# Patient Record
Sex: MALE | Race: WHITE | Employment: FULL TIME | URBAN - METROPOLITAN AREA
[De-identification: names, ages, dates, MRNs, and addresses within clinical notes are randomized per-mention and may not be internally consistent; named-entity substitution may affect disease eponyms.]

---

## 2017-02-18 LAB
CHOLESTEROL, TOTAL: 274 MG/DL
CHOLESTEROL/HDL RATIO: ABNORMAL
HDLC SERPL-MCNC: 47 MG/DL (ref 35–70)
LDL CHOLESTEROL CALCULATED: 186 MG/DL (ref 0–160)
TRIGL SERPL-MCNC: 207 MG/DL
TSH SERPL DL<=0.05 MIU/L-ACNC: 1.35 UIU/ML
VLDLC SERPL CALC-MCNC: 41 MG/DL

## 2017-02-23 DIAGNOSIS — R79.89 LOW TESTOSTERONE: Primary | ICD-10-CM

## 2017-02-23 DIAGNOSIS — E78.00 HYPERCHOLESTEROLEMIA: ICD-10-CM

## 2017-03-14 ENCOUNTER — OFFICE VISIT (OUTPATIENT)
Dept: FAMILY MEDICINE CLINIC | Age: 37
End: 2017-03-14

## 2017-03-14 VITALS
RESPIRATION RATE: 16 BRPM | HEART RATE: 87 BPM | WEIGHT: 280 LBS | BODY MASS INDEX: 39.05 KG/M2 | OXYGEN SATURATION: 98 % | SYSTOLIC BLOOD PRESSURE: 135 MMHG | TEMPERATURE: 98.6 F | DIASTOLIC BLOOD PRESSURE: 85 MMHG

## 2017-03-14 DIAGNOSIS — E78.00 HYPERCHOLESTEROLEMIA: ICD-10-CM

## 2017-03-14 DIAGNOSIS — R07.89 CHEST WALL PAIN: ICD-10-CM

## 2017-03-14 DIAGNOSIS — F11.11 OPIOID ABUSE, IN REMISSION (HCC): ICD-10-CM

## 2017-03-14 DIAGNOSIS — R79.89 LOW TESTOSTERONE: ICD-10-CM

## 2017-03-14 DIAGNOSIS — K21.9 GASTROESOPHAGEAL REFLUX DISEASE, ESOPHAGITIS PRESENCE NOT SPECIFIED: Primary | ICD-10-CM

## 2017-03-14 PROCEDURE — 99214 OFFICE O/P EST MOD 30 MIN: CPT | Performed by: FAMILY MEDICINE

## 2017-03-14 RX ORDER — ATORVASTATIN CALCIUM 20 MG/1
20 TABLET, FILM COATED ORAL DAILY
Qty: 30 TABLET | Refills: 5 | Status: SHIPPED | OUTPATIENT
Start: 2017-03-14 | End: 2017-09-18 | Stop reason: SDUPTHER

## 2017-03-14 RX ORDER — TESTOSTERONE CYPIONATE 200 MG/ML
200 INJECTION INTRAMUSCULAR ONCE
Qty: 10 ML | Refills: 0 | Status: SHIPPED | OUTPATIENT
Start: 2017-03-14 | End: 2017-04-04 | Stop reason: SDUPTHER

## 2017-03-31 ENCOUNTER — TELEPHONE (OUTPATIENT)
Dept: FAMILY MEDICINE CLINIC | Age: 37
End: 2017-03-31

## 2017-03-31 DIAGNOSIS — R79.89 ELEVATED PROLACTIN LEVEL: Primary | ICD-10-CM

## 2017-03-31 DIAGNOSIS — E78.00 HYPERCHOLESTEROLEMIA: ICD-10-CM

## 2017-03-31 DIAGNOSIS — R79.89 LOW TESTOSTERONE: ICD-10-CM

## 2017-04-04 DIAGNOSIS — R79.89 LOW TESTOSTERONE: ICD-10-CM

## 2017-04-04 RX ORDER — TESTOSTERONE CYPIONATE 200 MG/ML
200 INJECTION INTRAMUSCULAR ONCE
Qty: 10 ML | Refills: 0 | Status: SHIPPED | OUTPATIENT
Start: 2017-04-04 | End: 2017-05-23 | Stop reason: SDUPTHER

## 2017-04-11 ENCOUNTER — TELEPHONE (OUTPATIENT)
Dept: FAMILY MEDICINE CLINIC | Age: 37
End: 2017-04-11

## 2017-05-17 ENCOUNTER — OFFICE VISIT (OUTPATIENT)
Dept: FAMILY MEDICINE CLINIC | Age: 37
End: 2017-05-17

## 2017-05-17 VITALS
RESPIRATION RATE: 20 BRPM | OXYGEN SATURATION: 98 % | WEIGHT: 278 LBS | HEART RATE: 67 BPM | BODY MASS INDEX: 38.77 KG/M2 | SYSTOLIC BLOOD PRESSURE: 132 MMHG | DIASTOLIC BLOOD PRESSURE: 80 MMHG

## 2017-05-17 DIAGNOSIS — R10.11 RIGHT UPPER QUADRANT ABDOMINAL PAIN: Primary | ICD-10-CM

## 2017-05-17 LAB
BILIRUBIN, POC: NEGATIVE
BLOOD URINE, POC: NEGATIVE
CLARITY, POC: CLEAR
COLOR, POC: YELLOW
GLUCOSE URINE, POC: NEGATIVE
KETONES, POC: NEGATIVE
LEUKOCYTE EST, POC: NEGATIVE
NITRITE, POC: NEGATIVE
PH, POC: 6
PROTEIN, POC: NORMAL
SPECIFIC GRAVITY, POC: 1.01
UROBILINOGEN, POC: 0.2

## 2017-05-17 PROCEDURE — 81002 URINALYSIS NONAUTO W/O SCOPE: CPT | Performed by: FAMILY MEDICINE

## 2017-05-17 PROCEDURE — 99214 OFFICE O/P EST MOD 30 MIN: CPT | Performed by: FAMILY MEDICINE

## 2017-05-17 RX ORDER — ESOMEPRAZOLE MAGNESIUM 20 MG/1
20 TABLET, DELAYED RELEASE ORAL 2 TIMES DAILY
COMMUNITY
End: 2021-03-12

## 2017-05-23 ENCOUNTER — OFFICE VISIT (OUTPATIENT)
Dept: FAMILY MEDICINE CLINIC | Age: 37
End: 2017-05-23

## 2017-05-23 VITALS
BODY MASS INDEX: 40.56 KG/M2 | DIASTOLIC BLOOD PRESSURE: 83 MMHG | SYSTOLIC BLOOD PRESSURE: 120 MMHG | OXYGEN SATURATION: 97 % | HEART RATE: 76 BPM | TEMPERATURE: 95.9 F | WEIGHT: 290.8 LBS

## 2017-05-23 DIAGNOSIS — E78.00 HYPERCHOLESTEROLEMIA: ICD-10-CM

## 2017-05-23 DIAGNOSIS — K44.9 HIATAL HERNIA: ICD-10-CM

## 2017-05-23 DIAGNOSIS — R10.11 RUQ ABDOMINAL PAIN: Primary | ICD-10-CM

## 2017-05-23 DIAGNOSIS — K21.9 GASTROESOPHAGEAL REFLUX DISEASE, ESOPHAGITIS PRESENCE NOT SPECIFIED: ICD-10-CM

## 2017-05-23 DIAGNOSIS — R79.89 LOW TESTOSTERONE: ICD-10-CM

## 2017-05-23 DIAGNOSIS — F11.11 OPIOID ABUSE, IN REMISSION (HCC): ICD-10-CM

## 2017-05-23 DIAGNOSIS — E66.01 MORBID OBESITY, UNSPECIFIED OBESITY TYPE (HCC): ICD-10-CM

## 2017-05-23 PROCEDURE — 99214 OFFICE O/P EST MOD 30 MIN: CPT | Performed by: FAMILY MEDICINE

## 2017-05-23 RX ORDER — TESTOSTERONE CYPIONATE 200 MG/ML
200 INJECTION INTRAMUSCULAR WEEKLY
Qty: 10 ML | Refills: 5 | Status: SHIPPED | OUTPATIENT
Start: 2017-05-23 | End: 2019-09-23

## 2017-05-23 RX ORDER — CITALOPRAM 20 MG/1
20 TABLET ORAL DAILY
Qty: 30 TABLET | Refills: 5 | Status: SHIPPED | OUTPATIENT
Start: 2017-05-23 | End: 2018-05-08 | Stop reason: SDUPTHER

## 2017-06-09 ENCOUNTER — HOSPITAL ENCOUNTER (OUTPATIENT)
Dept: ULTRASOUND IMAGING | Age: 37
Discharge: HOME | End: 2017-06-09
Attending: FAMILY MEDICINE | Admitting: FAMILY MEDICINE

## 2017-06-09 DIAGNOSIS — R10.11 RUQ ABDOMINAL PAIN: ICD-10-CM

## 2017-06-09 DIAGNOSIS — R10.11 RIGHT UPPER QUADRANT PAIN: ICD-10-CM

## 2017-09-18 DIAGNOSIS — E78.00 HYPERCHOLESTEROLEMIA: ICD-10-CM

## 2017-09-18 RX ORDER — ATORVASTATIN CALCIUM 20 MG/1
20 TABLET, FILM COATED ORAL DAILY
Qty: 30 TABLET | Refills: 5 | Status: SHIPPED | OUTPATIENT
Start: 2017-09-18 | End: 2018-04-16 | Stop reason: SDUPTHER

## 2017-09-18 RX ORDER — ATORVASTATIN CALCIUM 20 MG/1
20 TABLET, FILM COATED ORAL DAILY
Qty: 30 TABLET | Refills: 5 | Status: CANCELLED | OUTPATIENT
Start: 2017-09-18

## 2017-10-03 DIAGNOSIS — R79.89 LOW TESTOSTERONE: ICD-10-CM

## 2017-10-04 ENCOUNTER — TELEPHONE (OUTPATIENT)
Dept: FAMILY MEDICINE CLINIC | Age: 37
End: 2017-10-04

## 2017-10-04 RX ORDER — TESTOSTERONE CYPIONATE 200 MG/ML
INJECTION INTRAMUSCULAR
Qty: 10 ML | Refills: 5 | Status: SHIPPED | OUTPATIENT
Start: 2017-10-04 | End: 2018-04-22 | Stop reason: SDUPTHER

## 2017-10-04 NOTE — TELEPHONE ENCOUNTER
Pharm needs clarification for the testosterone. The script states:  INJECT 1 ML INTRAMUSCULARLY ONCE FOR 1 DOSE            Pharm would like to know if this should be weekly.

## 2018-04-16 DIAGNOSIS — E78.00 HYPERCHOLESTEROLEMIA: ICD-10-CM

## 2018-04-16 RX ORDER — ATORVASTATIN CALCIUM 20 MG/1
TABLET, FILM COATED ORAL
Qty: 30 TABLET | Refills: 4 | Status: SHIPPED | OUTPATIENT
Start: 2018-04-16 | End: 2018-09-24 | Stop reason: SDUPTHER

## 2018-04-22 DIAGNOSIS — R79.89 LOW TESTOSTERONE: ICD-10-CM

## 2018-04-24 RX ORDER — TESTOSTERONE CYPIONATE 200 MG/ML
INJECTION INTRAMUSCULAR
Qty: 10 ML | Refills: 0 | Status: SHIPPED | OUTPATIENT
Start: 2018-04-24 | End: 2018-09-10 | Stop reason: SDUPTHER

## 2018-05-08 ENCOUNTER — OFFICE VISIT (OUTPATIENT)
Dept: FAMILY MEDICINE CLINIC | Age: 38
End: 2018-05-08

## 2018-05-08 VITALS
DIASTOLIC BLOOD PRESSURE: 75 MMHG | HEART RATE: 107 BPM | OXYGEN SATURATION: 97 % | WEIGHT: 278 LBS | SYSTOLIC BLOOD PRESSURE: 137 MMHG | HEIGHT: 72 IN | RESPIRATION RATE: 16 BRPM | TEMPERATURE: 96.5 F | BODY MASS INDEX: 37.65 KG/M2

## 2018-05-08 DIAGNOSIS — F11.11 OPIOID ABUSE, IN REMISSION (HCC): ICD-10-CM

## 2018-05-08 DIAGNOSIS — N46.9 MALE INFERTILITY: ICD-10-CM

## 2018-05-08 DIAGNOSIS — G89.29 CHRONIC LEFT SHOULDER PAIN: ICD-10-CM

## 2018-05-08 DIAGNOSIS — F41.9 ANXIETY: ICD-10-CM

## 2018-05-08 DIAGNOSIS — E66.9 OBESITY (BMI 30-39.9): ICD-10-CM

## 2018-05-08 DIAGNOSIS — R79.89 LOW TESTOSTERONE: ICD-10-CM

## 2018-05-08 DIAGNOSIS — K44.9 HIATAL HERNIA: ICD-10-CM

## 2018-05-08 DIAGNOSIS — Z00.00 ROUTINE GENERAL MEDICAL EXAMINATION AT A HEALTH CARE FACILITY: Primary | ICD-10-CM

## 2018-05-08 DIAGNOSIS — M25.512 CHRONIC LEFT SHOULDER PAIN: ICD-10-CM

## 2018-05-08 DIAGNOSIS — E78.00 HYPERCHOLESTEROLEMIA: ICD-10-CM

## 2018-05-08 DIAGNOSIS — R79.89 ELEVATED PROLACTIN LEVEL: ICD-10-CM

## 2018-05-08 PROCEDURE — 99396 PREV VISIT EST AGE 40-64: CPT | Performed by: FAMILY MEDICINE

## 2018-05-08 RX ORDER — CITALOPRAM 20 MG/1
20 TABLET ORAL DAILY
Qty: 30 TABLET | Refills: 5 | Status: SHIPPED | OUTPATIENT
Start: 2018-05-08 | End: 2018-12-27 | Stop reason: SDUPTHER

## 2018-05-08 ASSESSMENT — PATIENT HEALTH QUESTIONNAIRE - PHQ9
SUM OF ALL RESPONSES TO PHQ9 QUESTIONS 1 & 2: 0
2. FEELING DOWN, DEPRESSED OR HOPELESS: 0
1. LITTLE INTEREST OR PLEASURE IN DOING THINGS: 0
SUM OF ALL RESPONSES TO PHQ QUESTIONS 1-9: 0

## 2018-05-20 DIAGNOSIS — R79.89 LOW TESTOSTERONE: Primary | ICD-10-CM

## 2018-05-20 DIAGNOSIS — R79.89 ELEVATED PROLACTIN LEVEL: ICD-10-CM

## 2018-05-24 ENCOUNTER — TELEPHONE (OUTPATIENT)
Dept: FAMILY MEDICINE CLINIC | Age: 38
End: 2018-05-24

## 2018-05-24 DIAGNOSIS — R79.89 ELEVATED PROLACTIN LEVEL: Primary | ICD-10-CM

## 2018-07-09 DIAGNOSIS — R79.89 ELEVATED PROLACTIN LEVEL: ICD-10-CM

## 2018-07-09 DIAGNOSIS — R79.89 LOW TESTOSTERONE: ICD-10-CM

## 2018-07-10 ENCOUNTER — TELEPHONE (OUTPATIENT)
Dept: FAMILY MEDICINE CLINIC | Age: 38
End: 2018-07-10

## 2018-07-10 NOTE — TELEPHONE ENCOUNTER
Just an update. Richard Roe Called and spoke with pt and he acknowledges results. He states his mom is a RN that works for an endocrinologist and he will go to him.

## 2018-07-10 NOTE — TELEPHONE ENCOUNTER
MRI shows no large mass in pituitary but a small nonspecific finding that could suggest cause of his hormone balance issues.   Pt would benefit from referral to endocrine (dr. Skyler Fiore)  thanks

## 2018-07-10 NOTE — TELEPHONE ENCOUNTER
Pt called again to check on the status of results. He is anxious and would like to hear from the office as soon as possible. Please advise. Thanks. Da Burr [phone] (home)   Ok to leave message.

## 2018-08-28 ENCOUNTER — TELEPHONE (OUTPATIENT)
Dept: FAMILY MEDICINE CLINIC | Age: 38
End: 2018-08-28

## 2018-08-28 DIAGNOSIS — R79.89 LOW TESTOSTERONE: Primary | ICD-10-CM

## 2018-09-10 DIAGNOSIS — R79.89 LOW TESTOSTERONE: ICD-10-CM

## 2018-09-12 RX ORDER — TESTOSTERONE CYPIONATE 200 MG/ML
INJECTION INTRAMUSCULAR
Qty: 10 ML | Refills: 1 | Status: SHIPPED | OUTPATIENT
Start: 2018-09-12 | End: 2019-09-23

## 2018-09-12 NOTE — TELEPHONE ENCOUNTER
Refill request for testosterone injections medication.      Name of Philip Shabazz    Last visit - 5/8/18     Pending visit - No future apt sched at this time     Last refill -4/24/18

## 2018-12-26 ENCOUNTER — TELEPHONE (OUTPATIENT)
Dept: FAMILY MEDICINE CLINIC | Age: 38
End: 2018-12-26

## 2018-12-27 ENCOUNTER — OFFICE VISIT (OUTPATIENT)
Dept: FAMILY MEDICINE CLINIC | Age: 38
End: 2018-12-27
Payer: COMMERCIAL

## 2018-12-27 VITALS
RESPIRATION RATE: 18 BRPM | TEMPERATURE: 96.5 F | SYSTOLIC BLOOD PRESSURE: 132 MMHG | HEART RATE: 83 BPM | OXYGEN SATURATION: 95 % | DIASTOLIC BLOOD PRESSURE: 81 MMHG

## 2018-12-27 DIAGNOSIS — L72.3 SEBACEOUS CYST: ICD-10-CM

## 2018-12-27 DIAGNOSIS — R79.89 LOW TESTOSTERONE: ICD-10-CM

## 2018-12-27 DIAGNOSIS — R19.7 DIARRHEA, UNSPECIFIED TYPE: ICD-10-CM

## 2018-12-27 DIAGNOSIS — R79.89 PROLACTIN INCREASED: ICD-10-CM

## 2018-12-27 DIAGNOSIS — R73.9 HYPERGLYCEMIA: Primary | ICD-10-CM

## 2018-12-27 PROCEDURE — 99214 OFFICE O/P EST MOD 30 MIN: CPT | Performed by: FAMILY MEDICINE

## 2018-12-27 RX ORDER — CIPROFLOXACIN 500 MG/1
500 TABLET, FILM COATED ORAL 2 TIMES DAILY
Qty: 14 TABLET | Refills: 0 | Status: SHIPPED | OUTPATIENT
Start: 2018-12-27 | End: 2019-01-03

## 2018-12-27 RX ORDER — CITALOPRAM 20 MG/1
TABLET ORAL
Qty: 30 TABLET | Refills: 5 | Status: SHIPPED | OUTPATIENT
Start: 2018-12-27 | End: 2019-06-22 | Stop reason: SDUPTHER

## 2018-12-28 ENCOUNTER — HOSPITAL ENCOUNTER (OUTPATIENT)
Age: 38
Setting detail: SPECIMEN
Discharge: HOME | End: 2018-12-28
Payer: COMMERCIAL

## 2018-12-28 ENCOUNTER — TELEPHONE (OUTPATIENT)
Dept: FAMILY MEDICINE CLINIC | Age: 38
End: 2018-12-28

## 2018-12-28 DIAGNOSIS — R19.7 DIARRHEA, UNSPECIFIED TYPE: ICD-10-CM

## 2018-12-28 LAB — WHITE BLOOD CELLS (WBC), STOOL: ABNORMAL

## 2018-12-28 PROCEDURE — 87328 CRYPTOSPORIDIUM AG IA: CPT

## 2018-12-28 PROCEDURE — 87505 NFCT AGENT DETECTION GI: CPT

## 2018-12-28 PROCEDURE — 87336 ENTAMOEB HIST DISPR AG IA: CPT

## 2018-12-28 PROCEDURE — 87046 STOOL CULTR AEROBIC BACT EA: CPT

## 2018-12-28 PROCEDURE — 83630 LACTOFERRIN FECAL (QUAL): CPT

## 2018-12-29 LAB
CRYPTOSPORIDIUM ANTIGEN STOOL: NORMAL
E HISTOLYTICA ANTIGEN STOOL: NORMAL
GI BACTERIAL PATHOGENS BY PCR: NORMAL
GIARDIA ANTIGEN STOOL: NORMAL

## 2019-03-23 DIAGNOSIS — E78.00 HYPERCHOLESTEROLEMIA: ICD-10-CM

## 2019-03-25 RX ORDER — ATORVASTATIN CALCIUM 20 MG/1
TABLET, FILM COATED ORAL
Qty: 90 TABLET | Refills: 1 | Status: SHIPPED | OUTPATIENT
Start: 2019-03-25 | End: 2019-09-23 | Stop reason: SDUPTHER

## 2019-06-24 RX ORDER — CITALOPRAM 20 MG/1
TABLET ORAL
Qty: 90 TABLET | Refills: 1 | Status: SHIPPED | OUTPATIENT
Start: 2019-06-24 | End: 2019-12-30

## 2019-09-23 ENCOUNTER — OFFICE VISIT (OUTPATIENT)
Dept: FAMILY MEDICINE CLINIC | Age: 39
End: 2019-09-23
Payer: COMMERCIAL

## 2019-09-23 VITALS
SYSTOLIC BLOOD PRESSURE: 131 MMHG | DIASTOLIC BLOOD PRESSURE: 84 MMHG | RESPIRATION RATE: 12 BRPM | TEMPERATURE: 98.3 F | BODY MASS INDEX: 39.75 KG/M2 | OXYGEN SATURATION: 96 % | WEIGHT: 289 LBS | HEART RATE: 74 BPM

## 2019-09-23 DIAGNOSIS — E78.00 HYPERCHOLESTEROLEMIA: ICD-10-CM

## 2019-09-23 DIAGNOSIS — E66.9 OBESITY (BMI 30-39.9): ICD-10-CM

## 2019-09-23 DIAGNOSIS — E11.9 TYPE 2 DIABETES MELLITUS WITHOUT COMPLICATION, WITHOUT LONG-TERM CURRENT USE OF INSULIN (HCC): ICD-10-CM

## 2019-09-23 DIAGNOSIS — R73.9 HYPERGLYCEMIA: ICD-10-CM

## 2019-09-23 DIAGNOSIS — E23.6 PITUITARY MASS (HCC): ICD-10-CM

## 2019-09-23 DIAGNOSIS — J06.9 ACUTE URI: Primary | ICD-10-CM

## 2019-09-23 DIAGNOSIS — R51.9 SINUS HEADACHE: ICD-10-CM

## 2019-09-23 DIAGNOSIS — R79.89 LOW TESTOSTERONE: ICD-10-CM

## 2019-09-23 PROCEDURE — 99214 OFFICE O/P EST MOD 30 MIN: CPT | Performed by: FAMILY MEDICINE

## 2019-09-23 RX ORDER — AMOXICILLIN AND CLAVULANATE POTASSIUM 875; 125 MG/1; MG/1
1 TABLET, FILM COATED ORAL 2 TIMES DAILY
Qty: 20 TABLET | Refills: 0 | Status: SHIPPED | OUTPATIENT
Start: 2019-09-23 | End: 2019-10-03

## 2019-09-23 RX ORDER — ATORVASTATIN CALCIUM 20 MG/1
TABLET, FILM COATED ORAL
Qty: 90 TABLET | Refills: 1 | Status: SHIPPED | OUTPATIENT
Start: 2019-09-23 | End: 2020-04-14 | Stop reason: SDUPTHER

## 2019-09-23 ASSESSMENT — PATIENT HEALTH QUESTIONNAIRE - PHQ9
1. LITTLE INTEREST OR PLEASURE IN DOING THINGS: 0
SUM OF ALL RESPONSES TO PHQ QUESTIONS 1-9: 0
SUM OF ALL RESPONSES TO PHQ9 QUESTIONS 1 & 2: 0
2. FEELING DOWN, DEPRESSED OR HOPELESS: 0
SUM OF ALL RESPONSES TO PHQ QUESTIONS 1-9: 0

## 2019-09-23 NOTE — PROGRESS NOTES
auscultation bilaterally good air entry bilaterally  No crackles, wheeze. Breathing comfortably. Neuro : cranial nerves II-XII intact. Gait within normal limits. Sensation intact, strength 5/5 bilateral upper extremities, bilateral lower extremities     Current Outpatient Medications   Medication Sig Dispense Refill    atorvastatin (LIPITOR) 20 MG tablet TAKE 1 TABLET BY MOUTH ONE TIME A DAY 90 tablet 1    metFORMIN (GLUCOPHAGE) 500 MG tablet Take 500 mg by mouth 2 times daily (with meals)      amoxicillin-clavulanate (AUGMENTIN) 875-125 MG per tablet Take 1 tablet by mouth 2 times daily for 10 days 20 tablet 0    citalopram (CELEXA) 20 MG tablet TAKE 1 TABLET BY MOUTH ONCE DAILY 90 tablet 1    Esomeprazole Magnesium (NEXIUM 24HR) 20 MG TBEC Take 20 mg by mouth 2 times daily       No current facility-administered medications for this visit. ASSESSMENT / PLAN:    1. Acute URI  Persistent sx, c/w acute sinusitis  tx with augmentin 875mg BID x 10d  Cont over the counter/symptomatic treatment. Follow up for persistent symptoms in 7 to 10 days or sooner for worsening symptomatology     2. Hypercholesterolemia  Stable with lipitor  Check bloodwork as below  Management pending results. - atorvastatin (LIPITOR) 20 MG tablet; TAKE 1 TABLET BY MOUTH ONE TIME A DAY  Dispense: 90 tablet; Refill: 1  - Lipid Panel; Future    3. Low testosterone  Off therpay d/t fertility issues  Did have findings of enlarged pituitary with ? Mass  Due f/u MRI, Management pending results. F/u with endocrine    4. Sinus headache  D/t above, tx with augmentin    5. Type 2 diabetes mellitus without complication, without long-term current use of insulin ([facility] [address]  New onset, reviewed prior bloodwork, etiology and tx  Cont metformin 500mg BID, cont lipitor  Check bloodwork and urine as below  Management pending results. - Hemoglobin A1C; Future  - Comprehensive Metabolic Panel; Future  - TSH without Reflex;  Future  - Microalbumin /

## 2019-10-15 DIAGNOSIS — E23.6 PITUITARY MASS (HCC): ICD-10-CM

## 2019-10-17 ENCOUNTER — TELEPHONE (OUTPATIENT)
Dept: FAMILY MEDICINE CLINIC | Age: 39
End: 2019-10-17

## 2019-12-30 RX ORDER — CITALOPRAM 20 MG/1
TABLET ORAL
Qty: 90 TABLET | Refills: 0 | Status: SHIPPED | OUTPATIENT
Start: 2019-12-30 | End: 2020-03-30

## 2020-03-30 RX ORDER — CITALOPRAM 20 MG/1
TABLET ORAL
Qty: 30 TABLET | Refills: 5 | Status: SHIPPED | OUTPATIENT
Start: 2020-03-30 | End: 2020-04-08 | Stop reason: SDUPTHER

## 2020-04-08 RX ORDER — CITALOPRAM 20 MG/1
TABLET ORAL
Qty: 30 TABLET | Refills: 5 | Status: SHIPPED | OUTPATIENT
Start: 2020-04-08 | End: 2020-04-14 | Stop reason: SDUPTHER

## 2020-04-08 NOTE — TELEPHONE ENCOUNTER
Pt called and said that the pharmacy never got his medication refill from his doctor. Informed him it says he needs and appointment. Pt wants to know if he can get it filled without an appointment with everything that's going on.    Please advise

## 2020-04-09 RX ORDER — ATORVASTATIN CALCIUM 20 MG/1
TABLET, FILM COATED ORAL
Qty: 90 TABLET | Refills: 3 | Status: CANCELLED | OUTPATIENT
Start: 2020-04-09

## 2020-04-14 ENCOUNTER — VIRTUAL VISIT (OUTPATIENT)
Dept: FAMILY MEDICINE CLINIC | Age: 40
End: 2020-04-14
Payer: COMMERCIAL

## 2020-04-14 VITALS — TEMPERATURE: 96.4 F | HEIGHT: 74 IN | RESPIRATION RATE: 12 BRPM | WEIGHT: 270 LBS | BODY MASS INDEX: 34.65 KG/M2

## 2020-04-14 PROCEDURE — 99214 OFFICE O/P EST MOD 30 MIN: CPT | Performed by: FAMILY MEDICINE

## 2020-04-14 RX ORDER — ATORVASTATIN CALCIUM 20 MG/1
TABLET, FILM COATED ORAL
Qty: 90 TABLET | Refills: 1 | Status: CANCELLED | OUTPATIENT
Start: 2020-04-14

## 2020-04-14 RX ORDER — CITALOPRAM 20 MG/1
TABLET ORAL
Qty: 90 TABLET | Refills: 1 | Status: SHIPPED | OUTPATIENT
Start: 2020-04-14 | End: 2020-11-02

## 2020-04-14 RX ORDER — ATORVASTATIN CALCIUM 20 MG/1
TABLET, FILM COATED ORAL
Qty: 90 TABLET | Refills: 1 | Status: SHIPPED | OUTPATIENT
Start: 2020-04-14 | End: 2020-11-02

## 2020-04-14 RX ORDER — ATORVASTATIN CALCIUM 20 MG/1
TABLET, FILM COATED ORAL
Qty: 90 TABLET | Refills: 3 | Status: CANCELLED | OUTPATIENT
Start: 2020-04-14

## 2020-04-14 ASSESSMENT — PATIENT HEALTH QUESTIONNAIRE - PHQ9
SUM OF ALL RESPONSES TO PHQ9 QUESTIONS 1 & 2: 0
2. FEELING DOWN, DEPRESSED OR HOPELESS: 0
SUM OF ALL RESPONSES TO PHQ QUESTIONS 1-9: 0
SUM OF ALL RESPONSES TO PHQ QUESTIONS 1-9: 0
1. LITTLE INTEREST OR PLEASURE IN DOING THINGS: 0

## 2020-07-10 ENCOUNTER — TELEPHONE (OUTPATIENT)
Dept: FAMILY MEDICINE CLINIC | Age: 40
End: 2020-07-10

## 2020-07-10 NOTE — TELEPHONE ENCOUNTER
Patient would like this lab orders faxed to [phone]. Patient would like to have other blood work completed and wanted to speak with clinical. Please advise. Thanks.

## 2020-07-27 NOTE — TELEPHONE ENCOUNTER
Requested Prescriptions     Pending Prescriptions Disp Refills    metFORMIN (GLUCOPHAGE) 500 MG tablet [Pharmacy Med Name: METFORMIN  MG TABLET] 180 tablet 1     Sig: TAKE 1 TABLET BY MOUTH TWICE A DAY     Last ov 4/14/20  Last labs 7/15/20

## 2020-08-04 ENCOUNTER — OFFICE VISIT (OUTPATIENT)
Dept: FAMILY MEDICINE CLINIC | Age: 40
End: 2020-08-04
Payer: COMMERCIAL

## 2020-08-04 VITALS
RESPIRATION RATE: 14 BRPM | WEIGHT: 286.4 LBS | TEMPERATURE: 98 F | HEART RATE: 92 BPM | DIASTOLIC BLOOD PRESSURE: 77 MMHG | BODY MASS INDEX: 36.77 KG/M2 | SYSTOLIC BLOOD PRESSURE: 126 MMHG | OXYGEN SATURATION: 98 %

## 2020-08-04 PROCEDURE — 99214 OFFICE O/P EST MOD 30 MIN: CPT | Performed by: FAMILY MEDICINE

## 2020-08-04 RX ORDER — TESTOSTERONE CYPIONATE 200 MG/ML
INJECTION INTRAMUSCULAR
Qty: 10 ML | Refills: 5 | Status: SHIPPED | OUTPATIENT
Start: 2020-08-04 | End: 2021-02-05

## 2020-08-04 RX ORDER — OMEPRAZOLE 20 MG/1
20 CAPSULE, DELAYED RELEASE ORAL 2 TIMES DAILY
COMMUNITY

## 2020-08-04 NOTE — PROGRESS NOTES
Here for f/u and recheck of blood sugars, testosterone. Pt did see endo a few months ago and a1c was higher at 6.5. They did not change anything/add new meds. Pt is working on eating better, exercise more and feels that it is doing well with improvement in lifestyle. Pt did see endocrine as his testosterone was low again. Pt was off due to sperm production decrease and off meds, it was improved. Pt did freeze sperm as he wanted to get back on testosterone. testosterone level was very low and does feel fatigue, low energy. Pt was on injections in the past and did well with that. Pt would like to resume testosterone therapy. Pt states work is doing ok, did have a little bit decrease in business with COVID but that is getting better. Pt has some irritation to outer ear. Pt feels that there is a foreign body sensation to the cartilage. Sx for about 3 wks. Pt looked at it and RN looked at in, but has continued. No pain but just present and sx are on and off        Except as noted above in the history of present illness, the review of systems is  negative for headache, vision changes, chest pain, shortness of breath, abdominal pain, urinary sx, bowel changes. Past medical, surgical, and social history reviewed and updated  Medications and allergies reviewed and updated       O: /77   Pulse 92   Temp 98 °F (36.7 °C) (Temporal)   Resp 14   Wt 286 lb 6.4 oz (129.9 kg)   SpO2 98%   BMI 36.77 kg/m²   GEN: No acute distress, cooperative, well nourished, alert. HEENT: PEERLA, EOMI , normocephalic/atraumatic, nares and oropharynx clear. Mucous membranes normal, Tympanic membranes clear bilaterally. + tender to palpation to L temporomandibular joint   Neck: soft, supple, no thyromegaly, mass, no Lymphadenopathy  CV: Regular rate and rhythm, no murmur, rubs, gallops. No edema. Resp: Clear to auscultation bilaterally good air entry bilaterally  No crackles, wheeze.  Breathing comfortably. Psych: mood stable, No suicidal thoughts or ideation         Orders Only on 07/15/2020   Component Date Value Ref Range Status    Prolactin 07/15/2020 15.1  4.0 - 15.2 ng/mL Final     No components found for: CHLPL  Lab Results   Component Value Date    TRIG 181 (H) 07/15/2020     Lab Results   Component Value Date    HDL 48 07/15/2020     Lab Results   Component Value Date    LDLCALC 106 (H) 07/15/2020     Lab Results   Component Value Date    LABVLDL 36 07/15/2020       Lab Results   Component Value Date    ALT 25 07/15/2020    AST 23 07/15/2020    ALKPHOS 152 (H) 07/15/2020    BILITOT 0.5 07/15/2020          Lab Results   Component Value Date    LABA1C 6.1 (H) 07/15/2020     No results found for: EAG      ASSESSMENT / PLAN:    1. Low testosterone  Moderate sx s/p d/c of meds  Was off to help with fertility  Wants to resume therapy, refills given as below  f'u bloodwork 6wks after restart of meds  - testosterone cypionate (DEPOTESTOTERONE CYPIONATE) 200 MG/ML injection; INJECT  1ML  INTRAMUSCULARLY EVERY TWO WEEKS  Dispense: 10 mL; Refill: 5  - Testosterone; Future  - CBC; Future  - Basic Metabolic Panel; Future    2. Hyperglycemia, unspecified  a1c stable as below    3. Hypercholesterolemia  Cont statin therapy and monitor    4. Type 2 diabetes mellitus without complication, without long-term current use of insulin (HCC)  Doing well, controlled with a1c at 6.1  Cont metformin, statin therapy  Reviewed bloodwork as above    5. Obesity (BMI 30-39. 9)  Cont lifestyle mgt, diet/exercise    6. TMJ inflammation  L sided, The patient is reassured that these symptoms do not appear to represent a serious or threatening condition. Follow-up appointment:   Pending f/u bloodwork / prn    Discussed use, benefit, and side effects of all prescribed medications. Barriers to medication compliance addressed. All patient questions answered. Pt voiced understanding.   When applicable, patient's outside records were reviewed through [facility]. The patient has signed appropriate paperworks/consents.

## 2020-08-05 ENCOUNTER — TELEPHONE (OUTPATIENT)
Dept: ORTHOPEDIC SURGERY | Age: 40
End: 2020-08-05

## 2020-08-11 ENCOUNTER — TELEPHONE (OUTPATIENT)
Dept: FAMILY MEDICINE CLINIC | Age: 40
End: 2020-08-11

## 2020-08-11 NOTE — TELEPHONE ENCOUNTER
Patient states he spoke with Dr. HADDAD concerning a ear ache; ear is worse and painful down inside the ear. Patient would like a call back. Please advise. Thanks.

## 2020-08-17 ENCOUNTER — TELEPHONE (OUTPATIENT)
Dept: ENT CLINIC | Age: 40
End: 2020-08-17

## 2020-08-17 ENCOUNTER — OFFICE VISIT (OUTPATIENT)
Dept: ENT CLINIC | Age: 40
End: 2020-08-17
Payer: COMMERCIAL

## 2020-08-17 VITALS
BODY MASS INDEX: 37.22 KG/M2 | WEIGHT: 290 LBS | TEMPERATURE: 97.4 F | HEART RATE: 86 BPM | DIASTOLIC BLOOD PRESSURE: 89 MMHG | HEIGHT: 74 IN | SYSTOLIC BLOOD PRESSURE: 136 MMHG

## 2020-08-17 PROCEDURE — 99203 OFFICE O/P NEW LOW 30 MIN: CPT | Performed by: OTOLARYNGOLOGY

## 2020-08-17 PROCEDURE — 69200 CLEAR OUTER EAR CANAL: CPT | Performed by: OTOLARYNGOLOGY

## 2020-08-17 NOTE — PROGRESS NOTES
HISTORY: Family history reviewed. Except as noted in history of present illness, there is no pertinent family history      REVIEW OF SYSTEMS:  All pertinent positive and negative review of systems included in HPI. Otherwise, all systems are reviewed and negative. PHYSICAL EXAMINATION:   GENERAL: wdwn- no acute distress  RESPIRATORY:  No stridor or respiratory distress  COMMUNICATION :  Normal voice  MENTAL STATUS:  Mood and affect normal, oriented X 3  HEAD AND FACE:  No abnormalities of the skin of face or head  EXTERNAL EARS AND NOSE:  Normal pinnae bilateral  FACIAL MUSCLES:  All branches of facial nerve intact  EXTRAOCULAR MUSCLES: Intact with full range of motion  FACE PALPATION:  No tenderness over sinuses. Zygomatic arches and orbital rims intact  OTOSCOPY:  Microscope exam. Normal external auditory canals, tympanic membranes, and middle ear spaces. There is a hair in the external auditory canal on the left brushing against the tympanic membrane. TUNING FORKS: Rinne ++ Schmitz midline at 512 Hz  INTRANASAL:  Septum midline, turbinates normal, meati clear. LIPS, TEETH, GINGIVA:  Normal mucosa  PHARYNX:  Normal  NECK:  No masses. LYMPHATIC:  No cervical adenopathy  SALIVARY GLANDS:  No swelling or masses in the parotid or submandibular salivary glands  THYROID:  No goiter or thyroid masses. IMPRESSION: Foreign body left external auditory canal.    PLAN: Foreign body removed from the left external auditory canal using the microscope and a small alligator forceps. The tickling sensation in the left ear is resolved. FOLLOW-UP: As needed.

## 2020-08-26 ENCOUNTER — TELEPHONE (OUTPATIENT)
Dept: ENT CLINIC | Age: 40
End: 2020-08-26

## 2020-08-26 NOTE — TELEPHONE ENCOUNTER
Patient called to say that the tickling sensation has not gone away in his ear actually he feels that is has gone deeper into his ear because now there is a  crackling noise and when he opens his mouth / jaw it will release it and now the ear is becoming painful, he set up an appointment to come into the office on Sept 2 @ 11:40, but still would like to see if there is anything that can be done in the mean time.  Please advise

## 2020-09-02 ENCOUNTER — OFFICE VISIT (OUTPATIENT)
Dept: ENT CLINIC | Age: 40
End: 2020-09-02
Payer: COMMERCIAL

## 2020-09-02 VITALS
BODY MASS INDEX: 36.65 KG/M2 | WEIGHT: 285.6 LBS | DIASTOLIC BLOOD PRESSURE: 72 MMHG | TEMPERATURE: 97.9 F | SYSTOLIC BLOOD PRESSURE: 131 MMHG | HEIGHT: 74 IN | HEART RATE: 82 BPM

## 2020-09-02 PROBLEM — H92.02 REFERRED OTALGIA OF LEFT EAR: Status: ACTIVE | Noted: 2020-09-02

## 2020-09-02 PROCEDURE — 99213 OFFICE O/P EST LOW 20 MIN: CPT | Performed by: OTOLARYNGOLOGY

## 2020-09-02 RX ORDER — NAPROXEN 500 MG/1
500 TABLET ORAL 2 TIMES DAILY WITH MEALS
Qty: 60 TABLET | Refills: 2 | Status: SHIPPED | OUTPATIENT
Start: 2020-09-02 | End: 2021-03-12

## 2020-09-02 NOTE — PROGRESS NOTES
FOLLOW UP VISIT:    CHIEF COMPLAINT: Left ear pain. INTERIM HISTORY: Seen 2 weeks ago with a tickling, foreign body sensation in the left ear. I removed a foreign body in his symptoms seem to resolve but over the last 7 to 10 days he has had increased symptoms in the left ear. He now has pain deep in the ear. His ear clicks when he opens and closes his mouth. He has no otalgia or otorrhea. His hearing is not affected.       PAST MEDICAL HISTORY:   Social History     Tobacco Use   Smoking Status Never Smoker   Smokeless Tobacco Former User                                                    Social History     Substance and Sexual Activity   Alcohol Use No                                                    Current Outpatient Medications:     omeprazole (PRILOSEC) 20 MG delayed release capsule, Take 20 mg by mouth 2 times daily, Disp: , Rfl:     SYRINGE-NEEDLE, DISP, 3 ML 22G X 1-1/2\" 3 ML MISC, For weekly testosterone injections, Disp: 50 each, Rfl: 5    testosterone cypionate (DEPOTESTOTERONE CYPIONATE) 200 MG/ML injection, INJECT  1ML  INTRAMUSCULARLY EVERY TWO WEEKS, Disp: 10 mL, Rfl: 5    diclofenac sodium (VOLTAREN) 1 % GEL, Apply 2 g topically 4 times daily, Disp: 1 Tube, Rfl: 0    metFORMIN (GLUCOPHAGE) 500 MG tablet, TAKE 1 TABLET BY MOUTH TWICE A DAY, Disp: 180 tablet, Rfl: 1    citalopram (CELEXA) 20 MG tablet, Take 1 tablet by mouth once daily, Disp: 90 tablet, Rfl: 1    atorvastatin (LIPITOR) 20 MG tablet, TAKE 1 TABLET BY MOUTH ONE TIME A DAY, Disp: 90 tablet, Rfl: 1    Esomeprazole Magnesium (NEXIUM 24HR) 20 MG TBEC, Take 20 mg by mouth 2 times daily, Disp: , Rfl:                                                  Past Medical History:   Diagnosis Date    Anxiety     Drug addiction ([facility] [address]     Fatty liver     GERD (gastroesophageal reflux disease)     Hiatal hernia 5/23/2017    Hypercholesterolemia 2/23/2017    Hyperglycemia 12/27/2018    Low testosterone 3/14/2017    Obesity (BMI 30-39.9) 9/16/2016                                                  No past surgical history on file. FAMILY HISTORY: Family history reviewed and except as pertinent to the interim history is not contributory. REVIEW OF SYSTEMS:  All pertinent positive and negative findings included in HPI. Otherwise, all other systems are reviewed and are negative    PHYSICAL EXAMINATION:   GENERAL: wdwn- no acute distress  RESPIRATORY: No stridor. COMMUNICATION :  Normal voice  MENTAL STATUS: Alert and oriented x3  HEAD AND FACE: No skin lesions of the face. EXTERNAL EARS AND NOSE: The external ears and nasal pyramid are normal.  FACIAL MUSCLES: All branches of the facial nerve intact. FACE PALPATION: Zygomatic arches and orbital rims are intact. No tenderness over the sinuses. Tenderness over the left temporomandibular joint. EXTRAOCULAR MUSCLES: Intact with full range of motion. OTOSCOPY:  Normal tympanic membranes, middle ear spaces, and external auditory canals. TUNING FORKS:  Rinne ++ Schmitz midline at 512 Hz  INTRANASAL:  Septum midline, turbinates normal, meati clear. LIPS, TEETH, GINGIVA:  Normal mucosa  PHARYNX:  Normal  NECK:  No masses. LYMPH NODES: No cervical lymphadenopathy. SALIVARY GLANDS: Parotid and submandibular glands normal.  THYROID: No goiter or thyroid nodules palpable. IMPRESSION: Referred pain left ear due to TMJ arthritis. PLAN: Naproxen 500 mg prescribed to be taken twice daily with meals. Dental evaluation may be helpful. FOLLOW-UP: As needed.

## 2020-10-23 ENCOUNTER — OFFICE VISIT (OUTPATIENT)
Dept: PRIMARY CARE CLINIC | Age: 40
End: 2020-10-23
Payer: COMMERCIAL

## 2020-10-23 PROCEDURE — 99211 OFF/OP EST MAY X REQ PHY/QHP: CPT | Performed by: NURSE PRACTITIONER

## 2020-10-23 NOTE — PATIENT INSTRUCTIONS

## 2020-10-23 NOTE — PROGRESS NOTES
Ana Lilia Unger received a viral test for COVID-19. They were educated on isolation and quarantine as appropriate. For any symptoms, they were directed to seek care from their PCP, given contact information to establish with a doctor, directed to an urgent care or the emergency room.

## 2020-10-24 LAB — SARS-COV-2, NAA: NOT DETECTED

## 2021-01-12 ENCOUNTER — TELEPHONE (OUTPATIENT)
Dept: FAMILY MEDICINE CLINIC | Age: 41
End: 2021-01-12

## 2021-01-12 DIAGNOSIS — R79.89 LOW TESTOSTERONE: ICD-10-CM

## 2021-01-12 DIAGNOSIS — E78.00 HYPERCHOLESTEROLEMIA: Primary | ICD-10-CM

## 2021-01-12 DIAGNOSIS — R73.9 HYPERGLYCEMIA, UNSPECIFIED: ICD-10-CM

## 2021-01-12 DIAGNOSIS — E11.9 TYPE 2 DIABETES MELLITUS WITHOUT COMPLICATION, WITHOUT LONG-TERM CURRENT USE OF INSULIN (HCC): ICD-10-CM

## 2021-01-12 NOTE — TELEPHONE ENCOUNTER
Natasha More called requesting orders for lab work. There are orders in his chart from 8/4/2020 for BMP, CBC, and Testosterone. He would also like to check his Prolactin Level and his Hemoglobin A1C. He plans on going to the [facility] on [address]. Please advise. Thanks.         Natasha More [phone] (home)

## 2021-02-05 DIAGNOSIS — R79.89 LOW TESTOSTERONE: ICD-10-CM

## 2021-02-06 RX ORDER — TESTOSTERONE CYPIONATE 200 MG/ML
INJECTION INTRAMUSCULAR
Qty: 1 VIAL | Refills: 4 | Status: SHIPPED | OUTPATIENT
Start: 2021-02-06 | End: 2021-03-12 | Stop reason: SDUPTHER

## 2021-03-02 ENCOUNTER — PATIENT MESSAGE (OUTPATIENT)
Dept: FAMILY MEDICINE CLINIC | Age: 41
End: 2021-03-02

## 2021-03-03 NOTE — TELEPHONE ENCOUNTER
Last office visit was 8/4/20.       Ok to schedule in office or virtual?    Please advise      See note below

## 2021-03-12 ENCOUNTER — OFFICE VISIT (OUTPATIENT)
Dept: FAMILY MEDICINE CLINIC | Age: 41
End: 2021-03-12
Payer: COMMERCIAL

## 2021-03-12 VITALS
DIASTOLIC BLOOD PRESSURE: 64 MMHG | TEMPERATURE: 98.1 F | WEIGHT: 270 LBS | HEART RATE: 91 BPM | BODY MASS INDEX: 34.67 KG/M2 | OXYGEN SATURATION: 99 % | RESPIRATION RATE: 12 BRPM | SYSTOLIC BLOOD PRESSURE: 118 MMHG

## 2021-03-12 DIAGNOSIS — R79.89 LOW TESTOSTERONE: ICD-10-CM

## 2021-03-12 DIAGNOSIS — E78.00 HYPERCHOLESTEROLEMIA: ICD-10-CM

## 2021-03-12 DIAGNOSIS — J30.89 SEASONAL ALLERGIC RHINITIS DUE TO FUNGAL SPORES: ICD-10-CM

## 2021-03-12 DIAGNOSIS — D35.2 PITUITARY MICROADENOMA (HCC): ICD-10-CM

## 2021-03-12 DIAGNOSIS — F11.11 OPIOID ABUSE, IN REMISSION (HCC): ICD-10-CM

## 2021-03-12 DIAGNOSIS — E11.9 TYPE 2 DIABETES MELLITUS WITHOUT COMPLICATION, WITHOUT LONG-TERM CURRENT USE OF INSULIN (HCC): Primary | ICD-10-CM

## 2021-03-12 PROBLEM — R73.9 HYPERGLYCEMIA: Status: RESOLVED | Noted: 2018-12-27 | Resolved: 2021-03-12

## 2021-03-12 PROBLEM — J30.2 SEASONAL ALLERGIC RHINITIS DUE TO FUNGAL SPORES: Status: ACTIVE | Noted: 2021-03-12

## 2021-03-12 PROCEDURE — 99214 OFFICE O/P EST MOD 30 MIN: CPT | Performed by: FAMILY MEDICINE

## 2021-03-12 RX ORDER — CITALOPRAM 20 MG/1
TABLET ORAL
Qty: 30 TABLET | Refills: 5 | Status: SHIPPED | OUTPATIENT
Start: 2021-03-12 | End: 2021-11-01

## 2021-03-12 RX ORDER — ATORVASTATIN CALCIUM 20 MG/1
TABLET, FILM COATED ORAL
Qty: 30 TABLET | Refills: 5 | Status: SHIPPED | OUTPATIENT
Start: 2021-03-12 | End: 2021-11-01

## 2021-03-12 RX ORDER — FLUTICASONE PROPIONATE 50 MCG
1 SPRAY, SUSPENSION (ML) NASAL DAILY
COMMUNITY

## 2021-03-12 RX ORDER — TESTOSTERONE CYPIONATE 200 MG/ML
INJECTION INTRAMUSCULAR
Qty: 10 ML | Refills: 5 | Status: SHIPPED | OUTPATIENT
Start: 2021-03-12 | End: 2021-10-12

## 2021-03-12 ASSESSMENT — PATIENT HEALTH QUESTIONNAIRE - PHQ9
1. LITTLE INTEREST OR PLEASURE IN DOING THINGS: 0
SUM OF ALL RESPONSES TO PHQ QUESTIONS 1-9: 0
SUM OF ALL RESPONSES TO PHQ QUESTIONS 1-9: 0

## 2021-03-12 NOTE — PROGRESS NOTES
Here for fu and recheck of blood sugars, low testosterone    Pt states that stress is doing ok, managing ok despite COVID pandemic. Pt did have some increase in stress related to fertility issues but things seem to be manageable. Pt does continue to stay on suboxone, currently on a very tiny/low dose. Pt is able to take a very tiny dose of medication, but has not been able to get off completely. Pt states that pt and wife are 10 weeks pregnant, and things are doing well. They did IVF and currently working with dr. Dereje Leon, and then will be transferred to regular OB/gyn. Pt states that he is feelin well overall. Pt has been consistent with dosing of his meds, gets 200mg every 2 weeks. Pt does notice initially when he was first on the meds, feels better but after being on the med for a while will taper down a bit. Pt feels energy levels do ok for a while. pts last bloodwork as 3/1 and last shot was about 1 week before. Pt did see endocrine in fu of a mild increase in PRL and did have MRI showing pituitary microadenoma    Pt has noted some mild HA associated with sinus congestion. Pt did try flonase and sx did improve but got dizziness. Pt would like to try       Except as noted above in the history of present illness, the review of systems is  negative for headache, vision changes, chest pain, shortness of breath, abdominal pain, urinary sx, bowel changes. Past medical, surgical, and social history reviewed and updated  Medications and allergies reviewed and updated       O: /64   Pulse 91   Temp 98.1 °F (36.7 °C) (Temporal)   Resp 12   Wt 270 lb (122.5 kg)   SpO2 99%   BMI 34.67 kg/m²   GEN: No acute distress, cooperative, well nourished, alert. HEENT: PEERLA, EOMI , normocephalic/atraumatic, nares and oropharynx clear. Mucous membranes normal, Tympanic membranes clear bilaterally.     Neck: soft, supple, no thyromegaly, mass, no Lymphadenopathy  CV: Regular rate and rhythm, no Barriers to medication compliance addressed. All patient questions answered. Pt voiced understanding. When applicable, patient's outside records were reviewed through [facility]. The patient has signed appropriate paperworks/consents.

## 2021-09-01 NOTE — TELEPHONE ENCOUNTER
Requested Prescriptions     Pending Prescriptions Disp Refills    metFORMIN (GLUCOPHAGE) 500 MG tablet [Pharmacy Med Name: metFORMIN  MG TABLET] 60 tablet      Sig: TAKE ONE TABLET BY MOUTH TWICE A DAY       LOV 3/12/2021  No f/u  Labs 3/1/21

## 2021-10-06 RX ORDER — SYRINGE WITH NEEDLE, 1 ML 25GX5/8"
SYRINGE, EMPTY DISPOSABLE MISCELLANEOUS
Qty: 100 EACH | Refills: 0 | Status: SHIPPED | OUTPATIENT
Start: 2021-10-06

## 2021-10-06 NOTE — TELEPHONE ENCOUNTER
Requested Prescriptions     Pending Prescriptions Disp Refills    SYRINGE-NEEDLE, DISP, 3 ML (B-D 3CC LUER-GINNY SYR 80BU7-4/2) 22G X 1-1/2\" 3 ML MISC [Pharmacy Med Name: VITO Shameka Nowak 22GX1.5N SYRN] 100 each      Sig: USE WEEKLY FOR TESTOSTERONE INJECTIONS     Last ov 3/12/2021  Last labs 3/1/21

## 2021-10-11 DIAGNOSIS — R79.89 LOW TESTOSTERONE: ICD-10-CM

## 2021-10-12 RX ORDER — TESTOSTERONE CYPIONATE 200 MG/ML
INJECTION INTRAMUSCULAR
Qty: 4 ML | Refills: 5 | Status: SHIPPED | OUTPATIENT
Start: 2021-10-12 | End: 2022-04-28

## 2021-10-12 NOTE — TELEPHONE ENCOUNTER
Butch  called stating he is out of his testosterone and would like to know if his refill can be called in today. I let him know that message was routed to Dr. Camille Vick this morning and that he needs at least 24-48 hours for refills, but I will let him know he is checking on the status.

## 2021-10-12 NOTE — TELEPHONE ENCOUNTER
Requested Prescriptions     Pending Prescriptions Disp Refills    testosterone cypionate (DEPOTESTOTERONE CYPIONATE) 200 MG/ML injection [Pharmacy Med Name: TESTOSTERONE  MG/ML SDV] 4 mL      Sig: INJECT ONE MILLILITER INTRAMUSCULARLY ONCE EVERY 7 DAYS     Last ov 3/12/2021  Last labs 3/1/21

## 2021-10-31 DIAGNOSIS — E78.00 HYPERCHOLESTEROLEMIA: ICD-10-CM

## 2021-11-01 RX ORDER — CITALOPRAM 20 MG/1
TABLET ORAL
Qty: 30 TABLET | Refills: 5 | Status: SHIPPED | OUTPATIENT
Start: 2021-11-01 | End: 2022-06-03

## 2021-11-01 RX ORDER — ATORVASTATIN CALCIUM 20 MG/1
TABLET, FILM COATED ORAL
Qty: 30 TABLET | Refills: 5 | Status: SHIPPED | OUTPATIENT
Start: 2021-11-01 | End: 2022-06-03

## 2021-11-01 NOTE — TELEPHONE ENCOUNTER
Requested Prescriptions     Pending Prescriptions Disp Refills    atorvastatin (LIPITOR) 20 MG tablet [Pharmacy Med Name: ATORVASTATIN 20 MG TABLET] 30 tablet 5     Sig: TAKE ONE TABLET BY MOUTH DAILY    citalopram (CELEXA) 20 MG tablet [Pharmacy Med Name: CITALOPRAM HBR 20 MG TABLET] 30 tablet 5     Sig: TAKE ONE TABLET BY MOUTH DAILY     Last ov 3/12/2021  Last labs 7/15/20

## 2021-11-30 ENCOUNTER — PATIENT MESSAGE (OUTPATIENT)
Dept: FAMILY MEDICINE CLINIC | Age: 41
End: 2021-11-30

## 2021-11-30 DIAGNOSIS — E11.9 TYPE 2 DIABETES MELLITUS WITHOUT COMPLICATION, WITHOUT LONG-TERM CURRENT USE OF INSULIN (HCC): ICD-10-CM

## 2021-11-30 DIAGNOSIS — R79.89 LOW TESTOSTERONE: Primary | ICD-10-CM

## 2021-12-22 ENCOUNTER — HOSPITAL ENCOUNTER (OUTPATIENT)
Age: 41
Discharge: HOME | End: 2021-12-22
Payer: COMMERCIAL

## 2021-12-22 DIAGNOSIS — E11.9 TYPE 2 DIABETES MELLITUS WITHOUT COMPLICATION, WITHOUT LONG-TERM CURRENT USE OF INSULIN (HCC): ICD-10-CM

## 2021-12-22 DIAGNOSIS — R79.89 LOW TESTOSTERONE: ICD-10-CM

## 2021-12-22 LAB
A/G RATIO: 2.1 (ref 1.1–2.2)
ALBUMIN SERPL-MCNC: 4.5 G/DL (ref 3.4–5)
ALP BLD-CCNC: 114 U/L (ref 40–129)
ALT SERPL-CCNC: 18 U/L (ref 10–40)
ANION GAP SERPL CALCULATED.3IONS-SCNC: 13 MMOL/L (ref 3–16)
AST SERPL-CCNC: 18 U/L (ref 15–37)
BILIRUB SERPL-MCNC: 0.5 MG/DL (ref 0–1)
BUN BLDV-MCNC: 14 MG/DL (ref 7–20)
CALCIUM SERPL-MCNC: 8.9 MG/DL (ref 8.3–10.6)
CHLORIDE BLD-SCNC: 101 MMOL/L (ref 99–110)
CHOLESTEROL, TOTAL: 152 MG/DL (ref 0–199)
CO2: 27 MMOL/L (ref 21–32)
CREAT SERPL-MCNC: 1.3 MG/DL (ref 0.9–1.3)
CREATININE URINE: 291.1 MG/DL (ref 39–259)
GFR AFRICAN AMERICAN: >60
GFR NON-AFRICAN AMERICAN: >60
GLUCOSE BLD-MCNC: 102 MG/DL (ref 70–99)
HCT VFR BLD CALC: 38.8 % (ref 40.5–52.5)
HDLC SERPL-MCNC: 40 MG/DL (ref 40–60)
HEMOGLOBIN: 12.6 G/DL (ref 13.5–17.5)
LDL CHOLESTEROL CALCULATED: 81 MG/DL
MCH RBC QN AUTO: 25.8 PG (ref 26–34)
MCHC RBC AUTO-ENTMCNC: 32.5 G/DL (ref 31–36)
MCV RBC AUTO: 79.4 FL (ref 80–100)
MICROALBUMIN UR-MCNC: <1.2 MG/DL
MICROALBUMIN/CREAT UR-RTO: ABNORMAL MG/G (ref 0–30)
PDW BLD-RTO: 14.4 % (ref 12.4–15.4)
PLATELET # BLD: 332 K/UL (ref 135–450)
PMV BLD AUTO: 7.7 FL (ref 5–10.5)
POTASSIUM SERPL-SCNC: 4.4 MMOL/L (ref 3.5–5.1)
RBC # BLD: 4.89 M/UL (ref 4.2–5.9)
SODIUM BLD-SCNC: 141 MMOL/L (ref 136–145)
TOTAL PROTEIN: 6.6 G/DL (ref 6.4–8.2)
TRIGL SERPL-MCNC: 156 MG/DL (ref 0–150)
TSH SERPL DL<=0.05 MIU/L-ACNC: 4.55 UIU/ML (ref 0.27–4.2)
VLDLC SERPL CALC-MCNC: 31 MG/DL
WBC # BLD: 7.6 K/UL (ref 4–11)

## 2021-12-22 PROCEDURE — 80053 COMPREHEN METABOLIC PANEL: CPT

## 2021-12-22 PROCEDURE — 82570 ASSAY OF URINE CREATININE: CPT

## 2021-12-22 PROCEDURE — 36415 COLL VENOUS BLD VENIPUNCTURE: CPT

## 2021-12-22 PROCEDURE — 84443 ASSAY THYROID STIM HORMONE: CPT

## 2021-12-22 PROCEDURE — 85027 COMPLETE CBC AUTOMATED: CPT

## 2021-12-22 PROCEDURE — 84403 ASSAY OF TOTAL TESTOSTERONE: CPT

## 2021-12-22 PROCEDURE — 80061 LIPID PANEL: CPT

## 2021-12-22 PROCEDURE — 83036 HEMOGLOBIN GLYCOSYLATED A1C: CPT

## 2021-12-22 PROCEDURE — 82043 UR ALBUMIN QUANTITATIVE: CPT

## 2021-12-23 LAB
ESTIMATED AVERAGE GLUCOSE: 134.1 MG/DL
HBA1C MFR BLD: 6.3 %

## 2021-12-28 LAB — TESTOSTERONE TOTAL: 1087 NG/DL (ref 220–1000)

## 2022-01-13 ENCOUNTER — OFFICE VISIT (OUTPATIENT)
Dept: FAMILY MEDICINE CLINIC | Age: 42
End: 2022-01-13
Payer: COMMERCIAL

## 2022-01-13 VITALS
RESPIRATION RATE: 14 BRPM | HEART RATE: 90 BPM | HEIGHT: 74 IN | WEIGHT: 282.2 LBS | OXYGEN SATURATION: 99 % | SYSTOLIC BLOOD PRESSURE: 120 MMHG | DIASTOLIC BLOOD PRESSURE: 80 MMHG | TEMPERATURE: 97.1 F | BODY MASS INDEX: 36.22 KG/M2

## 2022-01-13 DIAGNOSIS — R79.89 LOW TESTOSTERONE: ICD-10-CM

## 2022-01-13 DIAGNOSIS — R06.83 SNORING: ICD-10-CM

## 2022-01-13 DIAGNOSIS — E11.9 TYPE 2 DIABETES MELLITUS WITHOUT COMPLICATION, WITHOUT LONG-TERM CURRENT USE OF INSULIN (HCC): ICD-10-CM

## 2022-01-13 DIAGNOSIS — F11.11 OPIOID ABUSE, IN REMISSION (HCC): ICD-10-CM

## 2022-01-13 DIAGNOSIS — R79.89 ELEVATED TSH: ICD-10-CM

## 2022-01-13 DIAGNOSIS — R53.83 FATIGUE, UNSPECIFIED TYPE: ICD-10-CM

## 2022-01-13 DIAGNOSIS — D35.2 PITUITARY MICROADENOMA (HCC): ICD-10-CM

## 2022-01-13 DIAGNOSIS — D64.9 MILD ANEMIA: ICD-10-CM

## 2022-01-13 DIAGNOSIS — F41.9 ANXIETY: ICD-10-CM

## 2022-01-13 DIAGNOSIS — Z00.00 ROUTINE GENERAL MEDICAL EXAMINATION AT A HEALTH CARE FACILITY: Primary | ICD-10-CM

## 2022-01-13 DIAGNOSIS — E78.00 HYPERCHOLESTEROLEMIA: ICD-10-CM

## 2022-01-13 PROCEDURE — 99396 PREV VISIT EST AGE 40-64: CPT | Performed by: FAMILY MEDICINE

## 2022-01-13 SDOH — ECONOMIC STABILITY: FOOD INSECURITY: WITHIN THE PAST 12 MONTHS, THE FOOD YOU BOUGHT JUST DIDN'T LAST AND YOU DIDN'T HAVE MONEY TO GET MORE.: NEVER TRUE

## 2022-01-13 SDOH — ECONOMIC STABILITY: TRANSPORTATION INSECURITY
IN THE PAST 12 MONTHS, HAS LACK OF TRANSPORTATION KEPT YOU FROM MEETINGS, WORK, OR FROM GETTING THINGS NEEDED FOR DAILY LIVING?: NO

## 2022-01-13 SDOH — ECONOMIC STABILITY: TRANSPORTATION INSECURITY
IN THE PAST 12 MONTHS, HAS THE LACK OF TRANSPORTATION KEPT YOU FROM MEDICAL APPOINTMENTS OR FROM GETTING MEDICATIONS?: NO

## 2022-01-13 SDOH — ECONOMIC STABILITY: FOOD INSECURITY: WITHIN THE PAST 12 MONTHS, YOU WORRIED THAT YOUR FOOD WOULD RUN OUT BEFORE YOU GOT MONEY TO BUY MORE.: NEVER TRUE

## 2022-01-13 ASSESSMENT — SOCIAL DETERMINANTS OF HEALTH (SDOH): HOW HARD IS IT FOR YOU TO PAY FOR THE VERY BASICS LIKE FOOD, HOUSING, MEDICAL CARE, AND HEATING?: NOT HARD AT ALL

## 2022-01-13 NOTE — PROGRESS NOTES
Here for well checkup, physical.  Pt states that overall has been doing well, staying healthy despite COVID pandemic. Pt states that overall does feel ok, but at times can feel sluggish. Pt doing testosterone supplementation 1ml weekly and has done well to increase his energy but still with some fatigue. Pt does continue on suboxone, currently taking a sliver of an 8mg strip TID (maybe 3mg a day) but has struggled with some persistent fatigue. Pt has been in a little bit of a 'funk' in the past few weeks. Things are good around the house but has some moderate stressors. Pt does have some moderate snoring. Pt does work with endo, for concern of mild increase in PRL level. They were not concerned as bloodwork was minimally increased, and they have not had any recent follow up with them. Bowels are normal      Except as noted in the history of present illness as above, the review of systems is negative for the following:    General ROS: negative  Psychological ROS: negative  Allergy and Immunology ROS: negative  Hematological and Lymphatic ROS: negative  Respiratory ROS: no cough, shortness of breath, or wheezing  Cardiovascular ROS: no chest pain or dyspnea on exertion  Gastrointestinal ROS: no abdominal pain, change in bowel habits, or black or bloody stools  Genito-Urinary ROS: no dysuria, trouble voiding, or hematuria  Musculoskeletal ROS: negative  Dermatological ROS: negative      Past medical, surgical, and social history reviewed and updated. Medications and allergies reviewed and updated        /80   Pulse 90   Temp 97.1 °F (36.2 °C) (Temporal)   Resp 14   Ht 6' 2\" (1.88 m)   Wt 282 lb 3.2 oz (128 kg)   SpO2 99%   BMI 36.23 kg/m²   General appearance - healthy, alert, no distress  Skin - Skin color, texture, turgor normal. No rashes or lesions. No suspicious findings  Head - Normocephalic. No masses, lesions, tenderness or abnormalities  Eyes - conjunctivae/corneas clear. Pupils equal and reactive to light and accomodation, extraocular muscles intact. Ears - External ears normal. Canals clear. Tympanic membranes normal bilaterally. Nose/Sinuses - Nares normal. Septum midline. Mucosa normal. No drainage or sinus tenderness. Oropharynx - Lips, mucosa, and tongue normal. Teeth and gums normal.   Neck - Neck supple. No adenopathy. Thyroid symmetric, normal size, no carotid bruit bilaterally  Back - Back symmetric, no curvature. Range of motion normal. No Costovertebral angle tenderness. Lungs - Percussion normal. Good diaphragmatic excursion. Lungs clear without wheeze, rales, crackles  Heart - Regular rate and rhythm, with no rub, murmur or gallop noted. Abdomen - Abdomen soft, non-tender. Bowel sounds normal. No masses, tenderness or organomegaly  Extremities - Extremities normal. No deformities, edema, or skin discoloration  Musculoskeletal - Spine ROM normal. Muscular strength intact. Peripheral pulses - radial=4/4,, femoral=4/4, popliteal=4/4, dorsalis pedis=4/4,  Neuro - Gait normal. Reflexes normal and symmetric.  Sensation grossly normal.  No focal weakness  Psych - mild breakthru dysthmia, No suicidal thoughts or ideation         Current Outpatient Medications   Medication Sig Dispense Refill    atorvastatin (LIPITOR) 20 MG tablet TAKE ONE TABLET BY MOUTH DAILY 30 tablet 5    citalopram (CELEXA) 20 MG tablet TAKE ONE TABLET BY MOUTH DAILY 30 tablet 5    testosterone cypionate (DEPOTESTOTERONE CYPIONATE) 200 MG/ML injection INJECT ONE MILLILITER INTRAMUSCULARLY ONCE EVERY 7 DAYS 4 mL 5    SYRINGE-NEEDLE, DISP, 3 ML (B-D 3CC LUER-GINNY SYR 87KZ2-3/2) 22G X 1-1/2\" 3 ML MISC USE WEEKLY FOR TESTOSTERONE INJECTIONS 100 each 0    metFORMIN (GLUCOPHAGE) 500 MG tablet TAKE ONE TABLET BY MOUTH TWICE A DAY 60 tablet 5    omeprazole (PRILOSEC) 20 MG delayed release capsule Take 20 mg by mouth 2 times daily      fluticasone (FLONASE) 50 MCG/ACT nasal spray 1 spray by Each Nostril route daily (Patient not taking: Reported on 1/13/2022)       No current facility-administered medications for this visit. Hospital Outpatient Visit on 12/22/2021   Component Date Value Ref Range Status    Testosterone 12/22/2021 1,087* 220 - 1,000 ng/dL Final    Julius Galvanab [id number] [facility], [address] [place] [address] [phone]    TSH 12/22/2021 4.55* 0.27 - 4.20 uIU/mL Final    Microalbumin, Random Urine 12/22/2021 <1.20  <2.0 mg/dL Final    Creatinine, Ur 12/22/2021 291.1* 39.0 - 259.0 mg/dL Final    Microalbumin Creatinine Ratio 12/22/2021 see below  0.0 - 30.0 mg/g Final    Comment: Ratio cannot be calculated since microalbumin level is below  the lower detection limit.  Hemoglobin A1C 12/22/2021 6.3  See comment % Final    Comment: Comment:  Diagnosis of Diabetes: > or = 6.5%  Increased risk of diabetes (Prediabetes): 5.7-6.4%  Glycemic Control: Nonpregnant Adults: <7.0%                    Pregnant: <6.0%        eAG 12/22/2021 134.1  mg/dL Final    Cholesterol, Total 12/22/2021 152  0 - 199 mg/dL Final    Triglycerides 12/22/2021 156* 0 - 150 mg/dL Final    HDL 12/22/2021 40  40 - 60 mg/dL Final    LDL Calculated 12/22/2021 81  <100 mg/dL Final    VLDL Cholesterol Calculated 12/22/2021 31  Not Established mg/dL Final    Sodium 12/22/2021 141  136 - 145 mmol/L Final    Potassium 12/22/2021 4.4  3.5 - 5.1 mmol/L Final    Chloride 12/22/2021 101  99 - 110 mmol/L Final    CO2 12/22/2021 27  21 - 32 mmol/L Final    Anion Gap 12/22/2021 13  3 - 16 Final    Glucose 12/22/2021 102* 70 - 99 mg/dL Final    BUN 12/22/2021 14  7 - 20 mg/dL Final    CREATININE 12/22/2021 1.3  0.9 - 1.3 mg/dL Final    GFR Non- 12/22/2021 >60  >60 Final    Comment: >60 mL/min/1.73m2 EGFR, calc. for ages 25 and older using the  MDRD formula (not corrected for weight), is valid for stable  renal function.       GFR  12/22/2021 >60  >60 Final    Comment: Chronic Kidney Disease: less than 60 ml/min/1.73 sq.m. Kidney Failure: less than 15 ml/min/1.73 sq.m. Results valid for patients 18 years and older.  Calcium 12/22/2021 8.9  8.3 - 10.6 mg/dL Final    Total Protein 12/22/2021 6.6  6.4 - 8.2 g/dL Final    Albumin 12/22/2021 4.5  3.4 - 5.0 g/dL Final    Albumin/Globulin Ratio 12/22/2021 2.1  1.1 - 2.2 Final    Total Bilirubin 12/22/2021 0.5  0.0 - 1.0 mg/dL Final    Alkaline Phosphatase 12/22/2021 114  40 - 129 U/L Final    ALT 12/22/2021 18  10 - 40 U/L Final    AST 12/22/2021 18  15 - 37 U/L Final    WBC 12/22/2021 7.6  4.0 - 11.0 K/uL Final    RBC 12/22/2021 4.89  4.20 - 5.90 M/uL Final    Hemoglobin 12/22/2021 12.6* 13.5 - 17.5 g/dL Final    Hematocrit 12/22/2021 38.8* 40.5 - 52.5 % Final    MCV 12/22/2021 79.4* 80.0 - 100.0 fL Final    MCH 12/22/2021 25.8* 26.0 - 34.0 pg Final    MCHC 12/22/2021 32.5  31.0 - 36.0 g/dL Final    RDW 12/22/2021 14.4  12.4 - 15.4 % Final    Platelets 02/77/6738 332  135 - 450 K/uL Final    MPV 12/22/2021 7.7  5.0 - 10.5 fL Final            ASSESSMENT / PLAN:    1. Routine general medical examination at a health care facility  No focal abnormalities on exam  Anticipatory guidance discussed. 2. Pituitary microadenoma ([facility] [address]  Due fu bloodwork, check PRL level  Management pending results. - PROLACTIN; Future    3. Low testosterone  Cont supplementation, doing well  monitor    4. Opioid abuse, in remission ([facility] [address]  Cont suboxone  Fu with pain mgt    5. Type 2 diabetes mellitus without complication, without long-term current use of insulin (HCC)  Stable @ goal    6. Elevated TSH  Minimal increase TSH  Check f/u bloodwork as below  - TSH without Reflex; Future  - T4, Free; Future    7. Snoring  Refer for sleep study  Strongly suggest ANA MARIA  - Krystal Linares MD, Pulmonary, [facility]    8.  Mild anemia  Minimal decrease  Check f/u bloodwork  Asymptomatic  Consider GI eval  - CBC Auto Differential; Future  - Iron and TIBC; Future  - Ferritin; Future    9. Fatigue, unspecified type  Suspect stress vs untreated obstructive sleep apnea  Refer for sleep study    10. Hypercholesterolemia  stable    11. Anxiety  Mild flare of sx d/t stressors  Cont supportive therapy, celexa  Consider increase in dosing           Follow-up appointment:   Pending f/u bloodwork     Discussed use, benefit, and side effects of all prescribed medications. Barriers to medication compliance addressed. All patient questions answered. Pt voiced understanding. When applicable, patient's outside records were reviewed through [facility]. The patient has signed appropriate paperworks/consents.

## 2022-02-15 ENCOUNTER — PATIENT MESSAGE (OUTPATIENT)
Dept: FAMILY MEDICINE CLINIC | Age: 42
End: 2022-02-15

## 2022-02-15 DIAGNOSIS — D35.2 PITUITARY MICROADENOMA (HCC): ICD-10-CM

## 2022-02-15 DIAGNOSIS — R79.89 ELEVATED PROLACTIN LEVEL: Primary | ICD-10-CM

## 2022-02-15 NOTE — TELEPHONE ENCOUNTER
[place] [facility] [place] I would like a referral for a new endo. Could you possibly put in an order for an MRI? I have had MRIs in the past due to a pituitary issue. Probably need to have this checked due to the high prolactin levels. My mom is an endocrinology nurse and says this needs to be done.

## 2022-02-15 NOTE — TELEPHONE ENCOUNTER
From: Carlitos Webber  To: Dr. Miguel Go: 2/15/2022 4:25 AM EST  Subject: Prolactin levels    Hi Dr. George Holt    Who should I see regarding the high prolactin levels? Should I schedule an appointment with you to discuss?

## 2022-03-01 NOTE — TELEPHONE ENCOUNTER
[employer] [facility]   March 3  [phone]  [address]  [address]  Tax Id [id number]  NPI [id number] [facility] all one entity  MRI brain with  and without contrast  [id number]  Approved 3-3-2022  Till April 1 2022  Auth # [id number]  Per Jamia Gonzalez  I called [facility] and gave them the auth number. I called the facility and they said that they are not in network for his plan. I gave them the authorization number and they said that it was still a possibility that his insurance will not pay. I also called the patient and told him that they told me that they were not in his plan and that he needs to call them and decide what he wants to do. and that the insurance still may not pay for this procedure.     FYMARIOLA

## 2022-03-08 DIAGNOSIS — D35.2 PITUITARY MICROADENOMA (HCC): ICD-10-CM

## 2022-03-08 DIAGNOSIS — R79.89 ELEVATED PROLACTIN LEVEL: ICD-10-CM

## 2022-03-25 NOTE — TELEPHONE ENCOUNTER
Requested Prescriptions     Pending Prescriptions Disp Refills    metFORMIN (GLUCOPHAGE) 500 MG tablet [Pharmacy Med Name: metFORMIN  MG TABLET] 60 tablet 5     Sig: TAKE ONE TABLET BY MOUTH TWICE A DAY     Last ov 1/13/2022  Last labs 2/12/22

## 2022-04-28 DIAGNOSIS — R79.89 LOW TESTOSTERONE: ICD-10-CM

## 2022-04-28 RX ORDER — TESTOSTERONE CYPIONATE 200 MG/ML
INJECTION INTRAMUSCULAR
Qty: 4 ML | Refills: 5 | Status: SHIPPED | OUTPATIENT
Start: 2022-04-28 | End: 2022-07-27

## 2022-04-28 NOTE — TELEPHONE ENCOUNTER
Requested Prescriptions     Pending Prescriptions Disp Refills    testosterone cypionate (DEPOTESTOTERONE CYPIONATE) 200 MG/ML injection [Pharmacy Med Name: TESTOSTERONE  MG/ML SDV] 4 mL      Sig: INJECT ONE MILLILITER INTRAMUSCULARLY ONCE EVERY 7 DAYS     Last ov 1/13/2022  Last labs 12/22/21    Component      Latest Ref Rng & Units 12/22/2021           9:57 AM   Testosterone      220 - 1,000 ng/dL 1,087 (H)

## 2022-05-03 ENCOUNTER — TELEPHONE (OUTPATIENT)
Dept: FAMILY MEDICINE CLINIC | Age: 42
End: 2022-05-03

## 2022-05-03 DIAGNOSIS — R79.89 LOW TESTOSTERONE: Primary | ICD-10-CM

## 2022-05-03 NOTE — TELEPHONE ENCOUNTER
Pt has been on the Testosterone injections for a while now. 2 days after the injection he gets a really bad headache to the point he can't function. This is even after take a 1/2 dose. Can pt use the gel instead? If he is able to switch over then Is there a place where he can place the gel where his 11 month old wont be able to touch that area.     Thank you

## 2022-05-05 RX ORDER — TESTOSTERONE 16.2 MG/G
2 GEL TRANSDERMAL DAILY
Qty: 75 G | Refills: 5 | Status: SHIPPED | OUTPATIENT
Start: 2022-05-05 | End: 2022-08-03

## 2022-05-05 NOTE — TELEPHONE ENCOUNTER
rx sent for topical testosterone therapy  Stop shots  He needs to put on and allow to completely dry before interacting with daughter.   High risk if she is exposed to this  F/u 1 month for recheck and bloodwork

## 2022-05-06 ENCOUNTER — TELEPHONE (OUTPATIENT)
Dept: ADMINISTRATIVE | Age: 42
End: 2022-05-06

## 2022-06-02 NOTE — TELEPHONE ENCOUNTER
Called [employer] at [phone] to see what other information was needed. Per rep Asim Collado, she will do some research and will give me a call back. ..

## 2022-06-02 NOTE — TELEPHONE ENCOUNTER
testosterone levels were checked while pt WAS ON therapy  Do we need to have him stop the med and then recheck the bloodwork for this to get covered?

## 2022-06-02 NOTE — TELEPHONE ENCOUNTER
\"Patient's Testosterone level are outside upper limits. Will patient's dose be adjusted?     Yes or NO\"

## 2022-06-03 DIAGNOSIS — E78.00 HYPERCHOLESTEROLEMIA: ICD-10-CM

## 2022-06-03 RX ORDER — CITALOPRAM 20 MG/1
TABLET ORAL
Qty: 30 TABLET | Refills: 5 | Status: SHIPPED | OUTPATIENT
Start: 2022-06-03

## 2022-06-03 RX ORDER — ATORVASTATIN CALCIUM 20 MG/1
TABLET, FILM COATED ORAL
Qty: 30 TABLET | Refills: 5 | Status: SHIPPED | OUTPATIENT
Start: 2022-06-03

## 2022-06-03 NOTE — TELEPHONE ENCOUNTER
Called [employer] at [phone] and spoke with Ermelinda De La Garza.  Per rep, patient does not need to stop taking the meds. They just need updated labs. They want to know if patients  levels are still high or outside the upper limits. And documentation on what the provider wants do moving forward adjusting the dose.

## 2022-06-29 ENCOUNTER — OFFICE VISIT (OUTPATIENT)
Dept: PULMONOLOGY | Age: 42
End: 2022-06-29
Payer: COMMERCIAL

## 2022-06-29 VITALS
BODY MASS INDEX: 34.32 KG/M2 | HEIGHT: 74 IN | WEIGHT: 267.4 LBS | SYSTOLIC BLOOD PRESSURE: 126 MMHG | HEART RATE: 89 BPM | TEMPERATURE: 96.5 F | OXYGEN SATURATION: 96 % | DIASTOLIC BLOOD PRESSURE: 70 MMHG

## 2022-06-29 DIAGNOSIS — G47.33 OSA (OBSTRUCTIVE SLEEP APNEA): Primary | ICD-10-CM

## 2022-06-29 PROCEDURE — 99203 OFFICE O/P NEW LOW 30 MIN: CPT | Performed by: INTERNAL MEDICINE

## 2022-06-29 ASSESSMENT — SLEEP AND FATIGUE QUESTIONNAIRES
HOW LIKELY ARE YOU TO NOD OFF OR FALL ASLEEP WHILE SITTING AND TALKING TO SOMEONE: 1
HOW LIKELY ARE YOU TO NOD OFF OR FALL ASLEEP WHILE LYING DOWN TO REST IN THE AFTERNOON WHEN CIRCUMSTANCES PERMIT: 3
ESS TOTAL SCORE: 13
HOW LIKELY ARE YOU TO NOD OFF OR FALL ASLEEP IN A CAR, WHILE STOPPED FOR A FEW MINUTES IN TRAFFIC: 1
HOW LIKELY ARE YOU TO NOD OFF OR FALL ASLEEP WHILE SITTING QUIETLY AFTER LUNCH WITHOUT ALCOHOL: 3
HOW LIKELY ARE YOU TO NOD OFF OR FALL ASLEEP WHEN YOU ARE A PASSENGER IN A CAR FOR AN HOUR WITHOUT A BREAK: 3
HOW LIKELY ARE YOU TO NOD OFF OR FALL ASLEEP WHILE SITTING AND READING: 1
HOW LIKELY ARE YOU TO NOD OFF OR FALL ASLEEP WHILE SITTING INACTIVE IN A PUBLIC PLACE: 1
HOW LIKELY ARE YOU TO NOD OFF OR FALL ASLEEP WHILE WATCHING TV: 0

## 2022-06-29 NOTE — PROGRESS NOTES
REASON FOR CONSULTATION/CC: Snoring, possible ANA MARIA  Chief Complaint   Patient presents with    New Patient     ref by Dr Bebeto Infante, snoring        Consult at request of  James Fried MD  [address],  [address] [place] [address]   for snoring in the setting   PCP: James Fried MD    HISTORY OF PRESENT ILLNESS: Veto eRese is a 38y.o. year old male with a history of hyperlipidemia, DM on metformin, GERD and obesity with BMI 34 who presents for evaluation of snoring. He states that this is been present for the last several years and is gotten to the point where it is loud enough that his wife will Yared Green him a couple times during the night. There are no witnessed gasps, snorts, chocolates, or apnea. He states that he wakens in the morning feeling tired and unrefreshed and always could sleep longer. He complains of very poor energy level and fatigue during the day. Sleep history:    Patients complains: Of very poor energy despite testosterone replacement    Bedtime  10 PM.  Wakes 6:30 AM    \"x\" indicates this is positive or the patient agrees.    [x] Loud Snoring [] Nighmares   [x] Frequent Awakenings at Night [] Teeth grinding during sleep   [] Chocking for breath at night [] Morning headaches   [] Gasping during sleep [] Morning dry mouth   [] I've been told that I stop breathing when sleep [] Sleep walking   [x] Restless sleep [] Sleep terrors   [x] Awaken un-refreshed [x] Tongue biting during sleep   [x] Waking up to urinate [] Bed wetting   [] Crawling feelings in legs when trying to sleep [] Acting out dreams   [] Leg kicking during sleep [] feeling paralyzed when falling asleep or waking up    [] Leg cramps during sleep [x] Dream-like images when falling asleep   [] Trouble falling asleep at night [] Sudden weakness when laughing   [] Trouble staying asleep at night [] Uncontrollable daytime sleep attacks   [] Racing thoughts when trying to sleep [] Falling asleep unexpectedly   [] Increased muscle tension when trying to sleep [] Falling asleep at work   [] Fear of being unable to sleep [] Falling asleep at school   [] Fear of being unable to fall back asleep after wakening at night [] Falling asleep while driving   [x] Laying in bed worrying when trying to sleep [] Recent change in sleep schedule   [] Waking too early in the morning [] Shift work interfering with sleep   [] Sleep taking [] I use sleeping pills to help me sleep   [x] Sweating a lot at night [] I use alcohol to help me sleep   [x] Waking up with heartburn [] Pain interfering with sleep   [x] Waking with reflux []        Roscoe Sleepiness Scale:    Sleep Medicine 6/29/2022   Sitting and reading 1   Watching TV 0   Sitting, inactive in a public place (e.g. a theatre or a meeting) 1   As a passenger in a car for an hour without a break 3   Lying down to rest in the afternoon when circumstances permit 3   Sitting and talking to someone 1   Sitting quietly after a lunch without alcohol 3   In a car, while stopped for a few minutes in traffic 1   Total score 13         0 = no chance of dozing  1 = slight chance of dozing  2 = moderate chance of dozing  3 = high chance of dozing    Interpretation:   0-7: It is unlikely that you are abnormally sleepy. 8-9:You have an average amount of daytime sleepiness. 10-15: You may be excessively sleepy depending on the situation. You may want to consider   seeking medical attention. 16-24: You are excessively sleepy and should consider seeking medical attention      PAST MEDICAL HISTORY:  Past Medical History:   Diagnosis Date    Anxiety     Diabetes mellitus ([facility] [address]     Drug addiction ([facility] [address]     Fatty liver     GERD (gastroesophageal reflux disease)     Hiatal hernia 05/23/2017    Hypercholesterolemia 02/23/2017    Hyperglycemia 12/27/2018    Low testosterone 03/14/2017    Obesity (BMI 30-39.9) 09/16/2016    Pituitary microadenoma (HCC)     Seasonal allergic rhinitis due to fungal spores 3/12/2021       PAST SURGICAL HISTORY:  No past surgical history on file. FAMILY HISTORY:  family history includes Elevated Lipids in his father; Hypertension in his father. SOCIAL HISTORY:    Has an 6month-old child  Works during the day as a   No tobacco use    ALLERGIES:  Patient has No Known Allergies. REVIEW OF SYSTEMS:  Constitutional: Negative for fever   HENT: Negative for sore throat  Eyes: Negative for redness   Respiratory: Negative for dyspnea, cough  Cardiovascular: Negative for chest pain  Gastrointestinal: Negative for vomiting, diarrhea   Genitourinary: Negative for hematuria   Musculoskeletal: Negative for arthralgias   Skin: Negative for rash  Neurological: Negative for syncope  Hematological: Negative for adenopathy  Psychiatric/Behavorial: Negative for anxiety    Objective:   PHYSICAL EXAM:  Blood pressure 126/70, pulse 89, temperature (!) 96.5 °F (35.8 °C), temperature source Infrared, height 6' 2\" (1.88 m), weight 267 lb 6.4 oz (121.3 kg), SpO2 96 %.'  Gen: No distress. Eyes: PERRL. No sclera icterus. No conjunctival injection. ENT: No discharge. Pharynx clear. External appearance of ears and nose normal.   Neck: Trachea midline. No obvious mass. Resp: No accessory muscle use. No crackles. No wheezes. No rhonchi. CV: Regular rate. Regular rhythm. No murmur or rub. No edema. GI: Non-tender. Non-distended. Skin: Warm, dry, normal texture and turgor. No nodule on exposed extremities. Lymph: No cervical LAD. No supraclavicular LAD. M/S: No cyanosis. No clubbing. No joint deformity. Neuro: Moves all four extremities. Psych: Oriented x 3. No anxiety. Awake. Alert. Intact judgement and insight.     Current Outpatient Medications   Medication Sig Dispense Refill    atorvastatin (LIPITOR) 20 MG tablet TAKE ONE TABLET BY MOUTH DAILY 30 tablet 5    citalopram (CELEXA) 20 mg tablet TAKE ONE TABLET BY MOUTH DAILY 30 tablet 5    Testosterone (ANDROGEL) 20.25 MG/ACT (1.62%) GEL gel Place 2 actuation onto the skin daily for 90 days. 75 g 5    metFORMIN (GLUCOPHAGE) 500 MG tablet TAKE ONE TABLET BY MOUTH TWICE A DAY 60 tablet 5    SYRINGE-NEEDLE, DISP, 3 ML (B-D 3CC LUER-GINNY SYR 11IU1-1/2) 22G X 1-1/2\" 3 ML MISC USE WEEKLY FOR TESTOSTERONE INJECTIONS 100 each 0    omeprazole (PRILOSEC) 20 MG delayed release capsule Take 20 mg by mouth 2 times daily      testosterone cypionate (DEPOTESTOTERONE CYPIONATE) 200 MG/ML injection INJECT ONE MILLILITER INTRAMUSCULARLY ONCE EVERY 7 DAYS (Patient not taking: Reported on 6/29/2022) 4 mL 5    fluticasone (FLONASE) 50 MCG/ACT nasal spray 1 spray by Each Nostril route daily (Patient not taking: Reported on 1/13/2022)       No current facility-administered medications for this visit.      Data Reviewed:   CBC and Renal reviewed  Last CBC  Lab Results   Component Value Date    WBC 6.4 02/12/2022    RBC 5.14 02/12/2022    HGB 13.0 02/12/2022    MCV 79 02/12/2022     02/12/2022     Last Renal  Lab Results   Component Value Date     12/22/2021    K 4.4 12/22/2021     12/22/2021    CO2 27 12/22/2021    CO2 26 07/15/2020    CO2 25 05/18/2018    BUN 14 12/22/2021    CREATININE 1.3 12/22/2021    GLUCOSE 102 12/22/2021    CALCIUM 8.9 12/22/2021           Assessment:     · High suspicion for ANA MARIA    Plan:      Home sleep study  Follow-up after home sleep study

## 2022-11-08 ENCOUNTER — TELEPHONE (OUTPATIENT)
Dept: FAMILY MEDICINE CLINIC | Age: 42
End: 2022-11-08

## 2022-11-08 DIAGNOSIS — E78.00 HYPERCHOLESTEROLEMIA: Primary | ICD-10-CM

## 2022-11-08 DIAGNOSIS — E11.9 TYPE 2 DIABETES MELLITUS WITHOUT COMPLICATION, WITHOUT LONG-TERM CURRENT USE OF INSULIN (HCC): ICD-10-CM

## 2022-11-08 DIAGNOSIS — R79.89 LOW TESTOSTERONE: ICD-10-CM

## 2022-11-08 NOTE — TELEPHONE ENCOUNTER
Patient is requesting blood work orders for an upcoming appointment on 11/28. He would like his orders to be faxed to [address] [place] [address] in [place].  Fax number is: [phone] Unable to offer, due to mother's clinical indication

## 2022-11-21 NOTE — TELEPHONE ENCOUNTER
Pt advised  Information given for Dr. Dianne Mireles [Alert] : alert [No Acute Distress] : no acute distress [Normocephalic] : normocephalic [EOMI Bilateral] : EOMI bilateral [Clear tympanic membranes with bony landmarks and light reflex present bilaterally] : clear tympanic membranes with bony landmarks and light reflex present bilaterally  [Pink Nasal Mucosa] : pink nasal mucosa [Nonerythematous Oropharynx] : nonerythematous oropharynx [Supple, full passive range of motion] : supple, full passive range of motion [No Palpable Masses] : no palpable masses [Clear to Auscultation Bilaterally] : clear to auscultation bilaterally [Regular Rate and Rhythm] : regular rate and rhythm [Normal S1, S2 audible] : normal S1, S2 audible [No Murmurs] : no murmurs [+2 Femoral Pulses] : +2 femoral pulses [Soft] : soft [NonTender] : non tender [Non Distended] : non distended [Normoactive Bowel Sounds] : normoactive bowel sounds [No Hepatomegaly] : no hepatomegaly [No Splenomegaly] : no splenomegaly [Aureliano: _____] : Aureliano [unfilled] [No Abnormal Lymph Nodes Palpated] : no abnormal lymph nodes palpated [Normal Muscle Tone] : normal muscle tone [No Gait Asymmetry] : no gait asymmetry [No pain or deformities with palpation of bone, muscles, joints] : no pain or deformities with palpation of bone, muscles, joints [Straight] : straight [+2 Patella DTR] : +2 patella DTR [Cranial Nerves Grossly Intact] : cranial nerves grossly intact [No Rash or Lesions] : no rash or lesions

## 2022-12-09 DIAGNOSIS — E78.00 HYPERCHOLESTEROLEMIA: ICD-10-CM

## 2022-12-09 RX ORDER — ATORVASTATIN CALCIUM 20 MG/1
TABLET, FILM COATED ORAL
Qty: 30 TABLET | Refills: 5 | Status: SHIPPED | OUTPATIENT
Start: 2022-12-09

## 2022-12-09 RX ORDER — CITALOPRAM 20 MG/1
TABLET ORAL
Qty: 30 TABLET | Refills: 5 | Status: SHIPPED | OUTPATIENT
Start: 2022-12-09

## 2023-05-23 LAB
A/G RATIO: 1.8 (ref 1.2–2.2)
ALBUMIN SERPL-MCNC: 4.4 G/DL (ref 4–5)
ALP BLD-CCNC: 174 IU/L (ref 44–121)
ALT SERPL-CCNC: 25 IU/L (ref 0–44)
AST SERPL-CCNC: 21 IU/L (ref 0–40)
BILIRUB SERPL-MCNC: 0.5 MG/DL (ref 0–1.2)
BILIRUBIN URINE: NEGATIVE
BLOOD, URINE: NEGATIVE
BUN / CREAT RATIO: 17 (ref 9–20)
BUN BLDV-MCNC: 17 MG/DL (ref 6–24)
CALCIUM SERPL-MCNC: 9.7 MG/DL (ref 8.7–10.2)
CHLORIDE BLD-SCNC: 102 MMOL/L (ref 96–106)
CHOLESTEROL, TOTAL: 174 MG/DL (ref 100–199)
CLARITY: ABNORMAL
CO2: 26 MMOL/L (ref 20–29)
COLOR: YELLOW
COMMENT: ABNORMAL
CREAT SERPL-MCNC: 0.98 MG/DL (ref 0.76–1.27)
ESTIMATED GLOMERULAR FILTRATION RATE CREATININE EQUATION: 97 ML/MIN/1.73
GLOBULIN: 2.4 G/DL (ref 1.5–4.5)
GLUCOSE BLD-MCNC: 98 MG/DL (ref 70–99)
GLUCOSE URINE: NEGATIVE
HBA1C MFR BLD: 6.4 % (ref 4.8–5.6)
HDLC SERPL-MCNC: 45 MG/DL
KETONES, URINE: NEGATIVE
LDL CHOLESTEROL CALCULATED: 100 MG/DL (ref 0–99)
LEUKOCYTE ESTERASE, URINE: NEGATIVE
MICROSCOPIC EXAMINATION: ABNORMAL
NITRITE, URINE: NEGATIVE
PH UA: 7 (ref 5–7.5)
POTASSIUM SERPL-SCNC: 4.9 MMOL/L (ref 3.5–5.2)
PROSTATE SPECIFIC ANTIGEN: 0.2 NG/ML (ref 0–4)
PROTEIN UA: NEGATIVE
SODIUM BLD-SCNC: 142 MMOL/L (ref 134–144)
SPECIFIC GRAVITY UA: 1.02 (ref 1–1.03)
T4 FREE: 1 NG/DL (ref 0.82–1.77)
TESTOSTERONE TOTAL-MALE: 68 NG/DL (ref 264–916)
TOTAL PROTEIN: 6.8 G/DL (ref 6–8.5)
TRIGL SERPL-MCNC: 167 MG/DL (ref 0–149)
TSH SERPL DL<=0.05 MIU/L-ACNC: 1.81 UIU/ML (ref 0.45–4.5)
UROBILINOGEN, URINE: 0.2 MG/DL (ref 0.2–1)
VLDLC SERPL CALC-MCNC: 29 MG/DL (ref 5–40)

## 2023-05-25 ENCOUNTER — PATIENT MESSAGE (OUTPATIENT)
Dept: FAMILY MEDICINE CLINIC | Age: 43
End: 2023-05-25

## 2023-05-25 ENCOUNTER — OFFICE VISIT (OUTPATIENT)
Dept: FAMILY MEDICINE CLINIC | Age: 43
End: 2023-05-25
Payer: COMMERCIAL

## 2023-05-25 VITALS
HEART RATE: 71 BPM | RESPIRATION RATE: 14 BRPM | WEIGHT: 281.2 LBS | OXYGEN SATURATION: 97 % | BODY MASS INDEX: 36.1 KG/M2 | TEMPERATURE: 97.9 F | DIASTOLIC BLOOD PRESSURE: 72 MMHG | SYSTOLIC BLOOD PRESSURE: 104 MMHG

## 2023-05-25 DIAGNOSIS — E11.9 TYPE 2 DIABETES MELLITUS WITHOUT COMPLICATION, WITHOUT LONG-TERM CURRENT USE OF INSULIN (HCC): ICD-10-CM

## 2023-05-25 DIAGNOSIS — R79.89 LOW TESTOSTERONE: Primary | ICD-10-CM

## 2023-05-25 DIAGNOSIS — E78.00 HYPERCHOLESTEROLEMIA: ICD-10-CM

## 2023-05-25 DIAGNOSIS — F11.11 OPIOID ABUSE, IN REMISSION (HCC): Primary | ICD-10-CM

## 2023-05-25 DIAGNOSIS — R79.89 LOW TESTOSTERONE: ICD-10-CM

## 2023-05-25 PROBLEM — H92.02 REFERRED OTALGIA OF LEFT EAR: Status: RESOLVED | Noted: 2020-09-02 | Resolved: 2023-05-25

## 2023-05-25 PROCEDURE — 99214 OFFICE O/P EST MOD 30 MIN: CPT | Performed by: FAMILY MEDICINE

## 2023-05-25 PROCEDURE — 3044F HG A1C LEVEL LT 7.0%: CPT | Performed by: FAMILY MEDICINE

## 2023-05-25 RX ORDER — METFORMIN HYDROCHLORIDE 500 MG/1
500 TABLET, EXTENDED RELEASE ORAL
Qty: 30 TABLET | Refills: 5 | Status: SHIPPED | OUTPATIENT
Start: 2023-05-25

## 2023-05-25 SDOH — ECONOMIC STABILITY: HOUSING INSECURITY
IN THE LAST 12 MONTHS, WAS THERE A TIME WHEN YOU DID NOT HAVE A STEADY PLACE TO SLEEP OR SLEPT IN A SHELTER (INCLUDING NOW)?: NO

## 2023-05-25 SDOH — ECONOMIC STABILITY: FOOD INSECURITY: WITHIN THE PAST 12 MONTHS, THE FOOD YOU BOUGHT JUST DIDN'T LAST AND YOU DIDN'T HAVE MONEY TO GET MORE.: NEVER TRUE

## 2023-05-25 SDOH — ECONOMIC STABILITY: FOOD INSECURITY: WITHIN THE PAST 12 MONTHS, YOU WORRIED THAT YOUR FOOD WOULD RUN OUT BEFORE YOU GOT MONEY TO BUY MORE.: NEVER TRUE

## 2023-05-25 SDOH — ECONOMIC STABILITY: INCOME INSECURITY: HOW HARD IS IT FOR YOU TO PAY FOR THE VERY BASICS LIKE FOOD, HOUSING, MEDICAL CARE, AND HEATING?: NOT HARD AT ALL

## 2023-05-25 ASSESSMENT — PATIENT HEALTH QUESTIONNAIRE - PHQ9
1. LITTLE INTEREST OR PLEASURE IN DOING THINGS: 0
SUM OF ALL RESPONSES TO PHQ9 QUESTIONS 1 & 2: 0
SUM OF ALL RESPONSES TO PHQ QUESTIONS 1-9: 0
2. FEELING DOWN, DEPRESSED OR HOPELESS: 0
SUM OF ALL RESPONSES TO PHQ QUESTIONS 1-9: 0

## 2023-05-25 NOTE — PROGRESS NOTES
Here for f/u and recheck of diabetes mellitus, cholesterol. Pt states that he has been doing well, sttaes that he has been doing well to cut down on excess sugar intake, and trying to cut down on carbs. Pt does struggle with remembering BID dosing. Pt was on testosterone supplementation and did find that with injections, was getting RAMOS. Pt did changing to the gel topical but still gave him HA. Pt continues on suboxone, only at 2mg-3mg a day, and has been on that dose for several years. Pt was looking at sublicade that can allow him to     Pt feels that mood is doing ok, but had some mild breakthru anxiety sx. Pt has been on celexa and would be interested in adjustment in tx      Except as noted above in the history of present illness, the review of systems is  negative for headache, vision changes, chest pain, shortness of breath, abdominal pain, urinary sx, bowel changes. Past medical, surgical, and social history reviewed and updated  Medications and allergies reviewed and updated        O: /72   Pulse 71   Temp 97.9 °F (36.6 °C) (Temporal)   Resp 14   Wt 281 lb 3.2 oz (127.6 kg)   SpO2 97%   BMI 36.10 kg/m²   GEN: No acute distress, cooperative, well nourished, alert. HEENT: PEERLA, EOMI , normocephalic/atraumatic, nares and oropharynx clear. Mucous membranes normal, Tympanic membranes clear bilaterally. Neck: soft, supple, no thyromegaly, mass, no Lymphadenopathy  CV: Regular rate and rhythm, no murmur, rubs, gallops. No edema. Resp: Clear to auscultation bilaterally good air entry bilaterally  No crackles, wheeze. Breathing comfortably.    Psych: mood stable, No suicidal thoughts or ideation         Current Outpatient Medications   Medication Sig Dispense Refill    metFORMIN (GLUCOPHAGE-XR) 500 MG extended release tablet Take 1 tablet by mouth daily (with breakfast) 30 tablet 5    atorvastatin (LIPITOR) 20 MG tablet TAKE ONE TABLET BY MOUTH DAILY 30 tablet 5    citalopram

## 2023-05-26 RX ORDER — TESTOSTERONE UNDECANOATE 237 MG/1
2 CAPSULE, LIQUID FILLED ORAL DAILY
Qty: 60 CAPSULE | Refills: 5 | Status: SHIPPED | OUTPATIENT
Start: 2023-05-26

## 2023-05-26 NOTE — TELEPHONE ENCOUNTER
From: Daniela Berry  To: Dr. Maria Teresa Swartz: 2023 7:34 PM EDT  Subject: Testosterone     We spoke with our insurance company regarding the [place]. They will cover it but need a pre authorization and documentation saying that he has tried the injections, chlomid, and the gel. Thank you!

## 2023-06-06 ENCOUNTER — PATIENT MESSAGE (OUTPATIENT)
Dept: FAMILY MEDICINE CLINIC | Age: 43
End: 2023-06-06

## 2023-06-07 RX ORDER — AZITHROMYCIN 250 MG/1
TABLET, FILM COATED ORAL
Qty: 6 TABLET | Refills: 0 | Status: SHIPPED | OUTPATIENT
Start: 2023-06-07

## 2023-06-07 NOTE — TELEPHONE ENCOUNTER
From: Dee Zazueta  To: Dr. Indiana Peña: 6/6/2023 7:41 PM EDT  Subject: Strep throat    Ba was in yesterday and got a positive strep test. We just took river and she is positive. Now Im feeling bad . Jenny Kiran Ba said that you would send in some meds for me . . would that be possible thanks. Jenny Kiran

## 2023-07-17 ENCOUNTER — TELEPHONE (OUTPATIENT)
Dept: FAMILY MEDICINE CLINIC | Age: 43
End: 2023-07-17

## 2023-07-17 NOTE — TELEPHONE ENCOUNTER
Patient  wife is calling about a PA for   testosterone enanthate (DELATESTRYL) 200 MG/ML injection [[id number]. She was asking for a call back to see if the provider received the pa b/c it was sent last month, last week, and today.

## 2023-07-18 ENCOUNTER — TELEPHONE (OUTPATIENT)
Dept: FAMILY MEDICINE CLINIC | Age: 43
End: 2023-07-18

## 2023-07-18 NOTE — TELEPHONE ENCOUNTER
Please start PA on   testosterone enanthate (DELATESTRYL) 200 MG/ML injection    Prescription was sent to the pharmacy on 6/16/23, instead of the pharmacy requesting a PA they went ahead and used there discount card and didn't tell patient that the prescription needed a PA. Pt spouse is requested to have a PA done but also to see if it can be back dated to 6/16/23.      Marking urgent as pt needs a refill on his prescription

## 2023-07-18 NOTE — TELEPHONE ENCOUNTER
Submitted PA for TESTOSTERONE  Via Counts include 234 beds at the [facility] Key: [id number] STATUS: PENDING. Follow up done daily; if no response in three days we will refax for status check. If another three days goes by with no response we will call the insurance for status.

## 2023-07-25 ENCOUNTER — TELEPHONE (OUTPATIENT)
Dept: FAMILY MEDICINE CLINIC | Age: 43
End: 2023-07-25

## 2023-07-25 NOTE — TELEPHONE ENCOUNTER
Patient's wife is calling to find out the status of the PA on her 's medication. Please call her concerning this matter at [phone]    testosterone enanthate (DELATESTRYL) 200 MG/ML injection [[id number]]     Order Details  Dose: 100 mg Route: IntraMUSCular Frequency: WEEKLY   Dispense Quantity: 5 mL Refills: 2          Sig: Inject 0.5 mLs into the muscle once a week for 30 doses.  Max Daily Amount: 100 mg   [facility] [id number] Sarah [place], [address] [phone] - F [phone]

## 2023-08-01 ENCOUNTER — TELEPHONE (OUTPATIENT)
Dept: FAMILY MEDICINE CLINIC | Age: 43
End: 2023-08-01

## 2023-08-01 NOTE — TELEPHONE ENCOUNTER
Pt's wife called and wanted to check the status of a PA for testosterone enanthate (DELATESTRYL) 200 MG/ML injection [[id number]]     Order Details  Dose: 100 mg Route: IntraMUSCular Frequency: WEEKLY   Dispense Quantity: 5 mL Refills: 2          Sig: Inject 0.5 mLs into the muscle once a week for 30 doses. Max Daily Amount: 100 mg         Start Date: 06/16/23 End Date: 01/06/24 after 30 doses   Written Date: 06/16/23 Expiration Date: 12/13/23       Diagnosis Association: Low testosterone (R79.89)   Providers    Authorizing Provider: Tiffanie Hamilton MD NPI: [id number]   Ordering User:  Tiffanie Hamilton MD     Please call her or the pt back regarding this matter.

## 2023-08-03 ENCOUNTER — TELEPHONE (OUTPATIENT)
Dept: FAMILY MEDICINE CLINIC | Age: 43
End: 2023-08-03

## 2023-08-03 DIAGNOSIS — R79.89 LOW TESTOSTERONE: Primary | ICD-10-CM

## 2023-08-03 NOTE — TELEPHONE ENCOUNTER
----- Message from [address] [place] [address] sent at 8/3/2023 12:44 PM EDT -----  Subject: Message to Provider    QUESTIONS  Information for Provider? The patient wife Erik Pike called today wanting a   status update on a prior auth for the testosterone injection. I did a WT   to the office at 12:38pm and was told she is at lunch until 1pm and to   send a msg per the . Please call back to advise.  ---------------------------------------------------------------------------  --------------  Terri CAPUTO  [phone]; OK to leave message on voicemail  ---------------------------------------------------------------------------  --------------  SCRIPT ANSWERS  Relationship to Patient? Spouse/Partner  Representative Name? wife  Is the representative on the Communication Release of Information (MITCHELL)   form in Epic?  Yes

## 2023-08-03 NOTE — TELEPHONE ENCOUNTER
The insurance is requiring 2 separate tests of testosterone that are low within 6 months to approve  He has one low test but prior tests were > 2 yrs ago  They would need repeat bloodwork

## 2023-08-03 NOTE — TELEPHONE ENCOUNTER
----- Message from Mary Ellen Cervantes sent at 8/3/2023  3:28 PM EDT -----  Subject: Message to Provider    QUESTIONS  Information for Provider? Geovanna Cardoso called again to speak to the nurse   about Anel. She is getting very frustrated because she has not heard   from the office. I am not able to reach the . Please call asap.  ---------------------------------------------------------------------------  --------------  Monik Peters INFO  [phone]; OK to leave message on voicemail  ---------------------------------------------------------------------------  --------------  SCRIPT ANSWERS  Relationship to Patient? Spouse/Partner  Representative Name? Geovanna Cardoso  Is the representative on the Communication Release of Information (MITCHELL)   form in Epic?  Yes

## 2023-08-04 NOTE — TELEPHONE ENCOUNTER
Please sign order for testosterone lab    Pt and spouse would like it faxed to [facility]

## 2023-08-21 DIAGNOSIS — R79.89 LOW TESTOSTERONE: Primary | ICD-10-CM

## 2023-08-22 LAB — TESTOSTERONE TOTAL-MALE: 201 NG/DL (ref 264–916)

## 2023-09-11 ENCOUNTER — PATIENT MESSAGE (OUTPATIENT)
Dept: FAMILY MEDICINE CLINIC | Age: 43
End: 2023-09-11

## 2023-09-11 ENCOUNTER — TELEPHONE (OUTPATIENT)
Dept: FAMILY MEDICINE CLINIC | Age: 43
End: 2023-09-11

## 2023-09-11 DIAGNOSIS — R79.89 LOW TESTOSTERONE: ICD-10-CM

## 2023-09-11 NOTE — TELEPHONE ENCOUNTER
From: Colton Roman  To: Dr. Juan Monson: 9/11/2023 11:34 AM EDT  Subject: PA    I have done the blood work as requested from our insurance In order to get an approved PA for the testosterone. Can you guys send over the paperwork to get this process started?  Thank you

## 2023-09-11 NOTE — TELEPHONE ENCOUNTER
Requested Prescriptions     Pending Prescriptions Disp Refills    testosterone enanthate (DELATESTRYL) 200 MG/ML injection 5 mL 2     Sig: Inject 0.5 mLs into the muscle once a week for 30 doses. Max Daily Amount: 100 mg     Please start PA on medication.

## 2023-09-13 NOTE — TELEPHONE ENCOUNTER
Submitted PA for Testosterone Enanthate to [email or web address]  EOC: [id number] STATUS: PENDING. Follow up done daily; if no response in three days we will refax for status check. If another three days goes by with no response we will call the insurance for status.

## 2023-09-28 RX ORDER — TESTOSTERONE CYPIONATE 200 MG/ML
INJECTION, SOLUTION INTRAMUSCULAR
Qty: 4 ML | Refills: 5 | Status: SHIPPED | OUTPATIENT
Start: 2023-09-28 | End: 2023-12-27

## 2023-10-10 DIAGNOSIS — R79.89 LOW TESTOSTERONE: ICD-10-CM

## 2023-10-10 RX ORDER — TESTOSTERONE ENANTHATE 200 MG/ML
100 INJECTION, SOLUTION INTRAMUSCULAR WEEKLY
Qty: 5 ML | Refills: 2 | Status: SHIPPED | OUTPATIENT
Start: 2023-10-10 | End: 2024-05-01

## 2023-10-10 NOTE — TELEPHONE ENCOUNTER
Pharmacy called because the wrong TESTERONE was sent to the pharmacy (testosterone cypionate (DEPOTESTOTERONE CYPIONATE) 200 MG/ML injection [[id number]] )      The pt needs this rx below that was approved per [facility]. Please sent this rx to the pharmacy thx.   testosterone enanthate (DELATESTRYL) 200 MG/ML injection [[id number]]     Order Details  Dose: 100 mg Route: IntraMUSCular Frequency: WEEKLY   Dispense Quantity: 5 mL Refills: 2          Sig: Inject 0.5 mLs into the muscle once a week for 30 doses.  Max Daily Amount: 100 mg         Start Date: 06/16/23 End Date: 01/06/24 after 30 doses   Written Date: 06/16/23       Associated Diagnoses: Low testosterone [R79.89]   Providers    Authorizing Provider: Thomas Vásquez MD NPI: [id number]   Ordering User:  Thomas Vásquez, [address] [facility] [id number] - [id number], [address] [facility] [phone] - F [phone]   [facility],[address] [place] [address]   Phone:  [phone]  Fax:  [phone]

## 2023-10-17 DIAGNOSIS — R79.89 LOW TESTOSTERONE: ICD-10-CM

## 2023-10-17 RX ORDER — TESTOSTERONE ENANTHATE 200 MG/ML
200 INJECTION, SOLUTION INTRAMUSCULAR WEEKLY
Qty: 4 ML | Refills: 5 | Status: SHIPPED | OUTPATIENT
Start: 2023-10-17 | End: 2024-05-08

## 2023-11-10 RX ORDER — CITALOPRAM 20 MG/1
TABLET ORAL
Qty: 90 TABLET | Refills: 1 | Status: SHIPPED | OUTPATIENT
Start: 2023-11-10

## 2023-11-10 NOTE — TELEPHONE ENCOUNTER
Requested Prescriptions     Pending Prescriptions Disp Refills    citalopram (CELEXA) 20 MG tablet [Pharmacy Med Name: CITALOPRAM HBR 20 MG TABLET] 90 tablet      Sig: TAKE 1 TABLET BY MOUTH DAILY          Last Office Visit: 5/25/2023     Next Office Visit: Visit date not found     Last Labs: 5/22/2023

## 2024-01-01 DIAGNOSIS — E78.00 HYPERCHOLESTEROLEMIA: ICD-10-CM

## 2024-01-01 RX ORDER — ATORVASTATIN CALCIUM 20 MG/1
TABLET, FILM COATED ORAL
Qty: 30 TABLET | Refills: 5 | Status: SHIPPED | OUTPATIENT
Start: 2024-01-01

## 2024-02-01 RX ORDER — METFORMIN HYDROCHLORIDE 500 MG/1
500 TABLET, EXTENDED RELEASE ORAL DAILY
Qty: 30 TABLET | Refills: 0 | Status: SHIPPED | OUTPATIENT
Start: 2024-02-01

## 2024-02-06 RX ORDER — SYRINGE WITH NEEDLE, 1 ML 25GX5/8"
SYRINGE, EMPTY DISPOSABLE MISCELLANEOUS
Qty: 12 EACH | OUTPATIENT
Start: 2024-02-06

## 2024-02-06 NOTE — TELEPHONE ENCOUNTER
Requested Prescriptions     Pending Prescriptions Disp Refills    B-D 3CC LUER-GINNY SYR 22HQ3-5/2 22G X 1-1/2\" 3 ML MISC [Pharmacy Med Name: BD 3ML LUERLOCK 22GX1.5N SYRN] 12 each      Sig: USE WEEKLY FOR TESTOSTERONE INJECTIONS         Last OV: 5/25/2023     Last labs: 5/22/2023     F/u: N/A

## 2024-02-09 DIAGNOSIS — E78.00 HYPERCHOLESTEROLEMIA: ICD-10-CM

## 2024-02-09 DIAGNOSIS — E11.9 TYPE 2 DIABETES MELLITUS WITHOUT COMPLICATION, WITHOUT LONG-TERM CURRENT USE OF INSULIN (HCC): Primary | ICD-10-CM

## 2024-02-09 DIAGNOSIS — R79.89 LOW TESTOSTERONE: ICD-10-CM

## 2024-03-03 RX ORDER — METFORMIN HYDROCHLORIDE 500 MG/1
500 TABLET, EXTENDED RELEASE ORAL DAILY
Qty: 30 TABLET | Refills: 0 | Status: SHIPPED | OUTPATIENT
Start: 2024-03-03

## 2024-03-28 LAB
CHOLESTEROL, TOTAL: 145 MG/DL
CHOLESTEROL/HDL RATIO: NORMAL
ESTIMATED AVERAGE GLUCOSE: ABNORMAL
HBA1C MFR BLD: 6.5 %
HDLC SERPL-MCNC: 41 MG/DL (ref 35–70)
LDL CHOLESTEROL CALCULATED: 85 MG/DL (ref 0–160)
NONHDLC SERPL-MCNC: NORMAL MG/DL
TRIGL SERPL-MCNC: 104 MG/DL
VLDLC SERPL CALC-MCNC: 19 MG/DL

## 2024-04-05 RX ORDER — METFORMIN HYDROCHLORIDE 500 MG/1
500 TABLET, EXTENDED RELEASE ORAL DAILY
Qty: 30 TABLET | Refills: 0 | Status: SHIPPED | OUTPATIENT
Start: 2024-04-05

## 2024-04-17 ENCOUNTER — OFFICE VISIT (OUTPATIENT)
Dept: FAMILY MEDICINE CLINIC | Age: 44
End: 2024-04-17
Payer: COMMERCIAL

## 2024-04-17 VITALS
OXYGEN SATURATION: 97 % | TEMPERATURE: 97.8 F | DIASTOLIC BLOOD PRESSURE: 62 MMHG | WEIGHT: 274.4 LBS | HEIGHT: 74 IN | RESPIRATION RATE: 14 BRPM | SYSTOLIC BLOOD PRESSURE: 122 MMHG | HEART RATE: 80 BPM | BODY MASS INDEX: 35.22 KG/M2

## 2024-04-17 DIAGNOSIS — E66.9 OBESITY (BMI 30-39.9): ICD-10-CM

## 2024-04-17 DIAGNOSIS — Z12.11 SCREEN FOR COLON CANCER: ICD-10-CM

## 2024-04-17 DIAGNOSIS — K76.0 FATTY LIVER: ICD-10-CM

## 2024-04-17 DIAGNOSIS — E11.9 TYPE 2 DIABETES MELLITUS WITHOUT COMPLICATION, WITHOUT LONG-TERM CURRENT USE OF INSULIN (HCC): Primary | ICD-10-CM

## 2024-04-17 DIAGNOSIS — F11.11 OPIOID ABUSE, IN REMISSION (HCC): ICD-10-CM

## 2024-04-17 DIAGNOSIS — F41.9 ANXIETY: ICD-10-CM

## 2024-04-17 DIAGNOSIS — Z23 NEED FOR DIPHTHERIA-TETANUS-PERTUSSIS (TDAP) VACCINE: ICD-10-CM

## 2024-04-17 DIAGNOSIS — E78.00 HYPERCHOLESTEROLEMIA: ICD-10-CM

## 2024-04-17 DIAGNOSIS — R79.89 LOW TESTOSTERONE: ICD-10-CM

## 2024-04-17 DIAGNOSIS — R00.2 PALPITATIONS: ICD-10-CM

## 2024-04-17 PROCEDURE — 90715 TDAP VACCINE 7 YRS/> IM: CPT | Performed by: FAMILY MEDICINE

## 2024-04-17 PROCEDURE — 93000 ELECTROCARDIOGRAM COMPLETE: CPT | Performed by: FAMILY MEDICINE

## 2024-04-17 PROCEDURE — 90471 IMMUNIZATION ADMIN: CPT | Performed by: FAMILY MEDICINE

## 2024-04-17 PROCEDURE — 99214 OFFICE O/P EST MOD 30 MIN: CPT | Performed by: FAMILY MEDICINE

## 2024-04-17 RX ORDER — DESVENLAFAXINE SUCCINATE 50 MG/1
50 TABLET, EXTENDED RELEASE ORAL DAILY
Qty: 30 TABLET | Refills: 5 | Status: SHIPPED | OUTPATIENT
Start: 2024-04-17

## 2024-04-17 ASSESSMENT — PATIENT HEALTH QUESTIONNAIRE - PHQ9
1. LITTLE INTEREST OR PLEASURE IN DOING THINGS: NOT AT ALL
SUM OF ALL RESPONSES TO PHQ QUESTIONS 1-9: 0
2. FEELING DOWN, DEPRESSED OR HOPELESS: NOT AT ALL
SUM OF ALL RESPONSES TO PHQ QUESTIONS 1-9: 0
SUM OF ALL RESPONSES TO PHQ9 QUESTIONS 1 & 2: 0

## 2024-04-17 NOTE — PROGRESS NOTES
Here for f/u and recheck of cholesterol, low testosterone, diabetes mellitus     Here for f/u of cholesterol.  Pt as been working on diet/exercise and is consistent with therapy.  No side effects from current therapy.  No chest pain, shortness of breath.  No numbness/tingling in extremities    Pt sig overdue for f/u and recheck of diabetes mellitus.  Pt has not had bloodwork done since 5/2023.      Here for f/u of cholesterol.  Pt as been working on diet/exercise and is consistent with therapy.  No side effects from current therapy.  No chest pain, shortness of breath.  No numbness/tingling in extremities     Pt states that he feels that he gets some fluttering to heart, sx are intermittent and random.  Has been trying to stay dehydrated.  When he gets sx, it lasts a few minutes.  Does seem to trigger with anxiety but not sure.  No lightheadedness, dizziness, no chest pressure.  No shortness of breath.      Pt states that anxiety sx have been flared up in the past few months, does not know if any trigger.  Has been on celexa and in the past has been doing well prior.  Pt is still on suboxone and is working on trying to taper, has been tapering down on 0.5mg but when tried to stop, did have moderate withdrawals and did go back up to 1 to 1.5.  has been on prozac,       Except as noted above in the history of present illness, the review of systems is  negative for headache, vision changes, chest pain, shortness of breath, abdominal pain, urinary sx, bowel changes.    Past medical, surgical, and social history reviewed and updated  Medications and allergies reviewed and updated      O: /62   Pulse 80   Temp 97.8 °F (36.6 °C) (Temporal)   Resp 14   Ht 1.88 m (6' 2\")   Wt 124.5 kg (274 lb 6.4 oz)   SpO2 97%   BMI 35.23 kg/m²   GEN: No acute distress, cooperative, well nourished, alert.   HEENT: PEERLA, EOMI , normocephalic/atraumatic, nares and oropharynx clear.  Mucous membranes normal, Tympanic membranes

## 2024-05-06 RX ORDER — METFORMIN HYDROCHLORIDE 500 MG/1
500 TABLET, EXTENDED RELEASE ORAL DAILY
Qty: 30 TABLET | Refills: 5 | Status: SHIPPED | OUTPATIENT
Start: 2024-05-06

## 2024-05-08 ENCOUNTER — PATIENT MESSAGE (OUTPATIENT)
Dept: FAMILY MEDICINE CLINIC | Age: 44
End: 2024-05-08

## 2024-05-09 DIAGNOSIS — R79.89 LOW TESTOSTERONE: ICD-10-CM

## 2024-05-09 NOTE — TELEPHONE ENCOUNTER
Last ov 4-  Next ov 5-28-24    Requested Prescriptions     Pending Prescriptions Disp Refills    testosterone enanthate (DELATESTRYL) 200 MG/ML injection [Pharmacy Med Name: TESTOSTERONE ENAN 200 MG/ML MDV] 5 mL 2     Sig: INJECT 1 ML INTO THE MUSCLE ONCE WEEKLY     Component      Latest Ref Rng 8/21/2023   Total Testosterone      264 - 916 ng/dL 201 (L)       Legend:  (L) Low

## 2024-05-09 NOTE — TELEPHONE ENCOUNTER
From: Yimi Arias  Sent: 5/9/2024 11:19 AM EDT  [facility]: [facility] [place] [facility]  Subject: New medicine     I have been Zoloft, Prozac, desiree(spelling) but that’s been 20-25 years ago. Nothing specific for mood like abilify. Would a mood stabilizer change the way this current medication works because it’s making me short tempered and angry.

## 2024-05-10 RX ORDER — ARIPIPRAZOLE 2 MG/1
2 TABLET ORAL DAILY
Qty: 30 TABLET | Refills: 3 | Status: SHIPPED | OUTPATIENT
Start: 2024-05-10

## 2024-05-11 RX ORDER — TESTOSTERONE ENANTHATE 200 MG/ML
INJECTION, SOLUTION INTRAMUSCULAR
Qty: 5 ML | Refills: 2 | Status: SHIPPED | OUTPATIENT
Start: 2024-05-11 | End: 2024-06-10

## 2024-05-19 RX ORDER — CITALOPRAM 20 MG/1
TABLET ORAL
Qty: 30 TABLET | Refills: 5 | Status: SHIPPED | OUTPATIENT
Start: 2024-05-19

## 2024-05-20 RX ORDER — CITALOPRAM 20 MG/1
20 TABLET ORAL DAILY
Qty: 30 TABLET | Refills: 5 | Status: SHIPPED | OUTPATIENT
Start: 2024-05-20

## 2024-05-28 ENCOUNTER — OFFICE VISIT (OUTPATIENT)
Dept: FAMILY MEDICINE CLINIC | Age: 44
End: 2024-05-28
Payer: COMMERCIAL

## 2024-05-28 VITALS
BODY MASS INDEX: 35.1 KG/M2 | HEART RATE: 75 BPM | RESPIRATION RATE: 16 BRPM | TEMPERATURE: 97.8 F | OXYGEN SATURATION: 98 % | WEIGHT: 273.4 LBS | DIASTOLIC BLOOD PRESSURE: 70 MMHG | SYSTOLIC BLOOD PRESSURE: 118 MMHG

## 2024-05-28 DIAGNOSIS — E11.9 TYPE 2 DIABETES MELLITUS WITHOUT COMPLICATION, WITHOUT LONG-TERM CURRENT USE OF INSULIN (HCC): Primary | ICD-10-CM

## 2024-05-28 DIAGNOSIS — Z13.6 SCREENING, ISCHEMIC HEART DISEASE: ICD-10-CM

## 2024-05-28 DIAGNOSIS — R00.2 PALPITATIONS: ICD-10-CM

## 2024-05-28 DIAGNOSIS — F41.9 ANXIETY: ICD-10-CM

## 2024-05-28 DIAGNOSIS — F41.8 DEPRESSION WITH ANXIETY: ICD-10-CM

## 2024-05-28 DIAGNOSIS — J06.9 ACUTE URI: ICD-10-CM

## 2024-05-28 PROCEDURE — 99214 OFFICE O/P EST MOD 30 MIN: CPT | Performed by: FAMILY MEDICINE

## 2024-05-28 PROCEDURE — G2211 COMPLEX E/M VISIT ADD ON: HCPCS | Performed by: FAMILY MEDICINE

## 2024-05-28 PROCEDURE — 3044F HG A1C LEVEL LT 7.0%: CPT | Performed by: FAMILY MEDICINE

## 2024-05-28 RX ORDER — AMOXICILLIN AND CLAVULANATE POTASSIUM 875; 125 MG/1; MG/1
1 TABLET, FILM COATED ORAL 2 TIMES DAILY
Qty: 20 TABLET | Refills: 0 | Status: SHIPPED | OUTPATIENT
Start: 2024-05-28 | End: 2024-06-07

## 2024-05-28 SDOH — ECONOMIC STABILITY: FOOD INSECURITY: WITHIN THE PAST 12 MONTHS, YOU WORRIED THAT YOUR FOOD WOULD RUN OUT BEFORE YOU GOT MONEY TO BUY MORE.: NEVER TRUE

## 2024-05-28 SDOH — ECONOMIC STABILITY: FOOD INSECURITY: WITHIN THE PAST 12 MONTHS, THE FOOD YOU BOUGHT JUST DIDN'T LAST AND YOU DIDN'T HAVE MONEY TO GET MORE.: NEVER TRUE

## 2024-05-28 SDOH — ECONOMIC STABILITY: INCOME INSECURITY: HOW HARD IS IT FOR YOU TO PAY FOR THE VERY BASICS LIKE FOOD, HOUSING, MEDICAL CARE, AND HEATING?: NOT HARD AT ALL

## 2024-05-28 NOTE — PROGRESS NOTES
Here for f/u and recheck of diabetes mellitus, low testosterone,. And palpitations.      At last visit, we discussed some issues of palpitations as well as mood.  We discussed a change of meds from celexa to pristiq and also did CAM monitoring.  He did not tolerate the transition to pristiq, with some mild irritability.   and we adjusted to a low dose restart of sertraline and added abilify 2mg .  Pt has been on that for 2 weeks and feel that it has been doing very well for him.  Pt happy with combination and wife is seeing some significant improvement.   Pt happy with the current dosing for now, and feels much better.      Palpitations has been much better, with sig decrease in frequency of palpitations.  Pt wore CAM in 2 weeks and had very little symptoms.        Except as noted above in the history of present illness, the review of systems is  negative for headache, vision changes, chest pain, shortness of breath, abdominal pain, urinary sx, bowel changes.    Past medical, surgical, and social history reviewed and updated  Medications and allergies reviewed and updated        O: /70   Pulse 75   Temp 97.8 °F (36.6 °C) (Temporal)   Resp 16   Wt 124 kg (273 lb 6.4 oz)   SpO2 98%   BMI 35.10 kg/m²   GEN: No acute distress, cooperative, well nourished, alert.   HEENT: PEERLA, EOMI , normocephalic/atraumatic, nares and oropharynx clear.  Mucous membranes normal, Tympanic membranes clear bilaterally.  Neck: soft, supple, no thyromegaly, mass, no Lymphadenopathy  CV: Regular rate and rhythm, no murmur, rubs, gallops. No edema.  Resp: Clear to auscultation bilaterally good air entry bilaterally  No crackles, wheeze. Breathing comfortably.   Psych: mood stable, No suicidal thoughts or ideation         Current Outpatient Medications   Medication Sig Dispense Refill    amoxicillin-clavulanate (AUGMENTIN) 875-125 MG per tablet Take 1 tablet by mouth 2 times daily for 10 days 20 tablet 0    testosterone enanthate

## 2024-06-30 DIAGNOSIS — E78.00 HYPERCHOLESTEROLEMIA: ICD-10-CM

## 2024-06-30 RX ORDER — ATORVASTATIN CALCIUM 20 MG/1
TABLET, FILM COATED ORAL
Qty: 30 TABLET | Refills: 5 | Status: SHIPPED | OUTPATIENT
Start: 2024-06-30

## 2024-07-22 PROBLEM — E11.9 TYPE 2 DIABETES MELLITUS WITHOUT COMPLICATION, WITHOUT LONG-TERM CURRENT USE OF INSULIN (HCC): Chronic | Status: ACTIVE | Noted: 2019-09-23

## 2024-07-22 PROBLEM — F41.8 DEPRESSION WITH ANXIETY: Chronic | Status: ACTIVE | Noted: 2024-05-28

## 2024-07-22 PROBLEM — J30.2 SEASONAL ALLERGIC RHINITIS DUE TO FUNGAL SPORES: Chronic | Status: ACTIVE | Noted: 2021-03-12

## 2024-08-12 ENCOUNTER — OFFICE VISIT (OUTPATIENT)
Dept: FAMILY MEDICINE CLINIC | Age: 44
End: 2024-08-12
Payer: COMMERCIAL

## 2024-08-12 VITALS
OXYGEN SATURATION: 97 % | WEIGHT: 277.8 LBS | HEART RATE: 81 BPM | HEIGHT: 74 IN | DIASTOLIC BLOOD PRESSURE: 70 MMHG | TEMPERATURE: 97.3 F | SYSTOLIC BLOOD PRESSURE: 110 MMHG | BODY MASS INDEX: 35.65 KG/M2

## 2024-08-12 DIAGNOSIS — E78.00 HYPERCHOLESTEROLEMIA: ICD-10-CM

## 2024-08-12 DIAGNOSIS — R53.83 FATIGUE, UNSPECIFIED TYPE: ICD-10-CM

## 2024-08-12 DIAGNOSIS — E66.9 OBESITY (BMI 30-39.9): ICD-10-CM

## 2024-08-12 DIAGNOSIS — R79.89 LOW TESTOSTERONE: ICD-10-CM

## 2024-08-12 DIAGNOSIS — F41.8 DEPRESSION WITH ANXIETY: ICD-10-CM

## 2024-08-12 DIAGNOSIS — F11.11 OPIOID ABUSE, IN REMISSION (HCC): ICD-10-CM

## 2024-08-12 DIAGNOSIS — E11.9 TYPE 2 DIABETES MELLITUS WITHOUT COMPLICATION, WITHOUT LONG-TERM CURRENT USE OF INSULIN (HCC): Primary | Chronic | ICD-10-CM

## 2024-08-12 PROCEDURE — G2211 COMPLEX E/M VISIT ADD ON: HCPCS | Performed by: FAMILY MEDICINE

## 2024-08-12 PROCEDURE — 3044F HG A1C LEVEL LT 7.0%: CPT | Performed by: FAMILY MEDICINE

## 2024-08-12 PROCEDURE — 99214 OFFICE O/P EST MOD 30 MIN: CPT | Performed by: FAMILY MEDICINE

## 2024-08-12 RX ORDER — CITALOPRAM 40 MG/1
40 TABLET ORAL DAILY
Qty: 30 TABLET | Refills: 5 | Status: SHIPPED | OUTPATIENT
Start: 2024-08-12

## 2024-08-12 NOTE — PROGRESS NOTES
Here for f/u and recheck of mood.  Pt states that he felt that celexa worked for a long time, but we then tried to change to different meds.  Pt tried pristiq but that made him irritable, and we changed to combination of sertraline and abilify.  Pt does not know any trigger for breakthru sx.  Pt feels energy level is low.  Pt did stop abilify and did not seem to make much of a different.  Pt is still on sertraline.   Pt does some counseling monthly through the suboxone clinic.      Pt does continue with testosterone therapy, supplementation.  Levels are better but doesn't feel helpful in terms of energy, sleep.  Pt is working to get set up with sleep management and is getting in for sleep study.    Here for f/u of cholesterol.  Pt as been working on diet/exercise and is consistent with therapy.  No side effects from current therapy.  No chest pain, shortness of breath.  No numbness/tingling in extremities       Except as noted above in the history of present illness, the review of systems is  negative for headache, vision changes, chest pain, shortness of breath, abdominal pain, urinary sx, bowel changes.    Past medical, surgical, and social history reviewed and updated  Medications and allergies reviewed and updated      Except as noted above in the history of present illness, the review of systems is  negative for headache, vision changes, chest pain, shortness of breath, abdominal pain, urinary sx, bowel changes.    Past medical, surgical, and social history reviewed and updated  Medications and allergies reviewed and updated        O: /70   Pulse 81   Temp 97.3 °F (36.3 °C)   Ht 1.88 m (6' 2\")   Wt 126 kg (277 lb 12.8 oz)   SpO2 97%   BMI 35.67 kg/m²   GEN: No acute distress, cooperative, well nourished, alert.   HEENT: PEERLA, EOMI , normocephalic/atraumatic, nares and oropharynx clear.  Mucous membranes normal, Tympanic membranes clear bilaterally.  Neck: soft, supple, no thyromegaly, mass, no

## 2024-09-06 ENCOUNTER — HOSPITAL ENCOUNTER (OUTPATIENT)
Dept: SLEEP CENTER | Age: 44
Discharge: HOME | End: 2024-09-08
Payer: COMMERCIAL

## 2024-09-06 DIAGNOSIS — G47.10 HYPERSOMNIA: ICD-10-CM

## 2024-09-06 DIAGNOSIS — R06.83 SNORING: ICD-10-CM

## 2024-09-06 PROCEDURE — 95810 POLYSOM 6/> YRS 4/> PARAM: CPT

## 2024-09-09 PROCEDURE — 95810 POLYSOM 6/> YRS 4/> PARAM: CPT | Performed by: INTERNAL MEDICINE

## 2024-09-10 ENCOUNTER — TELEPHONE (OUTPATIENT)
Dept: PULMONOLOGY | Age: 44
End: 2024-09-10

## 2024-09-13 ENCOUNTER — TELEPHONE (OUTPATIENT)
Dept: PULMONOLOGY | Age: 44
End: 2024-09-13

## 2024-09-13 DIAGNOSIS — G47.33 OBSTRUCTIVE SLEEP APNEA (ADULT) (PEDIATRIC): Primary | ICD-10-CM

## 2024-10-03 ENCOUNTER — TELEPHONE (OUTPATIENT)
Dept: PULMONOLOGY | Age: 44
End: 2024-10-03

## 2024-10-03 NOTE — TELEPHONE ENCOUNTER
Pt's spouse called wanting to know what the next study is for. Explained to her the titration process.  Message sent to Maribell to contact pt and spouse was given the number for the sleep lab to answer her questions

## 2024-10-29 DIAGNOSIS — R79.89 LOW TESTOSTERONE: ICD-10-CM

## 2024-10-29 NOTE — TELEPHONE ENCOUNTER
Medication:   Requested Prescriptions     Pending Prescriptions Disp Refills    testosterone enanthate (DELATESTRYL) 200 MG/ML injection [Pharmacy Med Name: TESTOSTERON ENAN 1,000 MG/5 ML] 5 mL      Sig: INJECT 1 MILLILITER INTRAMUSCULARLY ONCE WEEKLY     Last Filled:      Last appt: 8/12/2024   Next appt: Visit date not found    Last OARRS:       3/12/2021     2:55 PM   RX Monitoring   Periodic Controlled Substance Monitoring Possible medication side effects, risk of tolerance/dependence & alternative treatments discussed.;No signs of potential drug abuse or diversion identified.;Assessed functional status.;Obtaining appropriate analgesic effect of treatment.

## 2024-10-30 RX ORDER — TESTOSTERONE ENANTHATE 200 MG/ML
INJECTION, SOLUTION INTRAMUSCULAR
Qty: 5 ML | Refills: 5 | Status: SHIPPED | OUTPATIENT
Start: 2024-10-30 | End: 2025-01-28

## 2024-11-04 RX ORDER — METFORMIN HYDROCHLORIDE 500 MG/1
500 TABLET, EXTENDED RELEASE ORAL DAILY
Qty: 30 TABLET | Refills: 5 | Status: SHIPPED | OUTPATIENT
Start: 2024-11-04

## 2024-11-15 ENCOUNTER — HOSPITAL ENCOUNTER (OUTPATIENT)
Dept: SLEEP CENTER | Age: 44
Discharge: HOME | End: 2024-11-17
Payer: COMMERCIAL

## 2024-11-15 DIAGNOSIS — G47.33 OBSTRUCTIVE SLEEP APNEA (ADULT) (PEDIATRIC): ICD-10-CM

## 2024-11-15 PROCEDURE — 95811 POLYSOM 6/>YRS CPAP 4/> PARM: CPT

## 2024-11-19 ENCOUNTER — TELEPHONE (OUTPATIENT)
Dept: PULMONOLOGY | Age: 44
End: 2024-11-19

## 2024-11-19 NOTE — TELEPHONE ENCOUNTER
Pt returning call to review titration results.  Order to be sent to [facility] in [place].  Call transferred for pt to schedule f/u.

## 2024-11-19 NOTE — PROGRESS NOTES
Yimi Hugo         : 1980 [facility]    Diagnosis: [x] ANA MARIA (G47.33) [] CSA (G47.31) [] Apnea (G47.30)   Length of Need: [x] 18 Months [] 99 Months [] Other:    Machine (NICA!): [] Respironics Dream Station   2   Auto [x] ResMed AirSense/AirCurve     Auto S11 or S10  [] Other:     []  CPAP () [x] Bilevel ()   Mode: [] Auto [] Spontaneous    Mode: [x] Auto [] Spontaneous       IPAP max 25 cmH2O  EPAP min 12 cmH2O  PS 4 cmH2O     Comfort Settings:   - Ramp Pressure: 5 cmH2O                                        - Ramp time: 15 min                                     -  Flex/EPR - 3 full time                                    - For ResMed Bilevel (TiMax-4 sec   TiMin- 0.2 sec)     Humidifier: [x] Heated ()        [x] Water chamber replacement ()/ 1 per 6 months        Mask:   [x] Nasal () /1 per 3 months [x] Full Face () /1 per 3 months   [x] Patient choice -Size and fit mask [x] Patient Choice - Size and fit mask   [] Dispense:  [] Dispense:    [x] Headgear () / 1 per 3 months [x] Headgear () / 1 per 3 months   [x] Replacement Nasal Cushion ()/2 per month [x] Interface Replacement ()/1 per month   [x] Replacement Nasal Pillows ()/2 per month         Tubing: [x] Heated ()/1 per 3 months    [] Standard ()/1 per 3 months [] Other:           Filters: [x] Non-disposable ()/1 per 6 months     [x] Ultra-Fine, Disposable ()/2 per month        Miscellaneous: [x] Chin Strap ()/ 1 per 6 months [] O2 bleed-in:       LPM   [] Oximetry on CPAP/Bilevel []  Other:    [x] Modem: ()         Start Order Date: 24    MEDICAL JUSTIFICATION:  I, the undersigned, certify that the above prescribed supplies are medically necessary for this patient’s wellbeing.  In my opinion, the supplies are both reasonable and necessary in reference to accepted standards of medicalpractice in treatment of this patient’s condition.    Roberto WEBER

## 2024-11-19 NOTE — PROCEDURES
PROCEDURE NOTE  Date: 11/19/2024   Name: Yimi Arias  YOB: 1980    Procedures                Electronically signed by NANCY KHAN MD on 11/19/2024 at 9:29 AM

## 2025-01-11 DIAGNOSIS — E78.00 HYPERCHOLESTEROLEMIA: ICD-10-CM

## 2025-01-12 RX ORDER — ATORVASTATIN CALCIUM 20 MG/1
TABLET, FILM COATED ORAL
Qty: 30 TABLET | Refills: 5 | Status: SHIPPED | OUTPATIENT
Start: 2025-01-12

## 2025-02-11 RX ORDER — CITALOPRAM HYDROBROMIDE 40 MG/1
40 TABLET ORAL DAILY
Qty: 30 TABLET | Refills: 5 | Status: SHIPPED | OUTPATIENT
Start: 2025-02-11

## 2025-02-18 PROBLEM — G47.33 OBSTRUCTIVE SLEEP APNEA: Status: ACTIVE | Noted: 2025-02-18

## 2025-03-12 RX ORDER — METFORMIN HYDROCHLORIDE 500 MG/1
500 TABLET, EXTENDED RELEASE ORAL DAILY
Qty: 30 TABLET | Refills: 5 | Status: SHIPPED | OUTPATIENT
Start: 2025-03-12

## 2025-03-12 NOTE — TELEPHONE ENCOUNTER
Requested Prescriptions     Pending Prescriptions Disp Refills    metFORMIN (GLUCOPHAGE-XR) 500 MG extended release tablet 30 tablet 5     Sig: Take 1 tablet by mouth daily          Last OV: 8/12/2024     Last labs: 8/21/2023     F/u: N/A

## 2025-03-30 ENCOUNTER — RESULTS FOLLOW-UP (OUTPATIENT)
Dept: FAMILY MEDICINE CLINIC | Age: 45
End: 2025-03-30

## 2025-05-28 DIAGNOSIS — R79.89 LOW TESTOSTERONE: ICD-10-CM

## 2025-05-29 NOTE — TELEPHONE ENCOUNTER
Medication:   Requested Prescriptions     Pending Prescriptions Disp Refills    testosterone enanthate (DELATESTRYL) 200 MG/ML injection [Pharmacy Med Name: TESTOSTERON ENAN 1,000 MG/5 ML] 5 mL      Sig: INJECT 1 MILLILITER INTRAMUSCULARLY ONCE WEEKLY        Last Filled:  10/30/24    Last appt: 8/12/2024   Next appt: Visit date not found  Per last note:  Follow-up appointment:   Pending bloodwork  1 month    Last OARRS:       3/12/2021     2:55 PM   RX Monitoring   Periodic Controlled Substance Monitoring Possible medication side effects, risk of tolerance/dependence & alternative treatments discussed.;No signs of potential drug abuse or diversion identified.;Assessed functional status.;Obtaining appropriate analgesic effect of treatment.

## 2025-05-30 RX ORDER — TESTOSTERONE ENANTHATE 200 MG/ML
INJECTION, SOLUTION INTRAMUSCULAR
Qty: 5 ML | Refills: 0 | Status: SHIPPED | OUTPATIENT
Start: 2025-05-30 | End: 2025-08-28

## 2025-06-02 DIAGNOSIS — R79.89 LOW TESTOSTERONE: ICD-10-CM

## 2025-06-02 RX ORDER — TESTOSTERONE ENANTHATE 200 MG/ML
INJECTION, SOLUTION INTRAMUSCULAR
Qty: 5 ML | Refills: 0 | OUTPATIENT
Start: 2025-06-02 | End: 2025-08-31

## 2025-06-02 NOTE — TELEPHONE ENCOUNTER
Medication:   Requested Prescriptions     Pending Prescriptions Disp Refills    testosterone enanthate (DELATESTRYL) 200 MG/ML injection 5 mL 0     Last Filled:  5/30/25    Last appt: 8/12/2024   Next appt: Visit date not found    Last OARRS:       3/12/2021     2:55 PM   RX Monitoring   Periodic Controlled Substance Monitoring Possible medication side effects, risk of tolerance/dependence & alternative treatments discussed.;No signs of potential drug abuse or diversion identified.;Assessed functional status.;Obtaining appropriate analgesic effect of treatment.

## 2025-06-18 DIAGNOSIS — E78.00 HYPERCHOLESTEROLEMIA: ICD-10-CM

## 2025-06-18 RX ORDER — ATORVASTATIN CALCIUM 20 MG/1
20 TABLET, FILM COATED ORAL DAILY
Qty: 90 TABLET | Refills: 0 | Status: SHIPPED | OUTPATIENT
Start: 2025-06-18

## 2025-06-18 NOTE — TELEPHONE ENCOUNTER
Informed Pt tried to schedule him he said he will call back after he get his blood work done to schedule

## 2025-07-29 ENCOUNTER — PATIENT MESSAGE (OUTPATIENT)
Dept: FAMILY MEDICINE CLINIC | Age: 45
End: 2025-07-29

## 2025-07-29 DIAGNOSIS — R79.89 LOW TESTOSTERONE: ICD-10-CM

## 2025-07-30 RX ORDER — TESTOSTERONE ENANTHATE 200 MG/ML
INJECTION, SOLUTION INTRAMUSCULAR
Qty: 5 ML | Refills: 0 | Status: SHIPPED | OUTPATIENT
Start: 2025-07-30 | End: 2025-10-28

## 2025-08-11 ENCOUNTER — OFFICE VISIT (OUTPATIENT)
Dept: FAMILY MEDICINE CLINIC | Age: 45
End: 2025-08-11
Payer: COMMERCIAL

## 2025-08-11 VITALS
OXYGEN SATURATION: 98 % | SYSTOLIC BLOOD PRESSURE: 118 MMHG | HEART RATE: 75 BPM | BODY MASS INDEX: 36.06 KG/M2 | DIASTOLIC BLOOD PRESSURE: 66 MMHG | WEIGHT: 281 LBS | HEIGHT: 74 IN

## 2025-08-11 DIAGNOSIS — F11.11 OPIOID ABUSE, IN REMISSION (HCC): Chronic | ICD-10-CM

## 2025-08-11 DIAGNOSIS — H69.92 DYSFUNCTION OF LEFT EUSTACHIAN TUBE: ICD-10-CM

## 2025-08-11 DIAGNOSIS — Z12.11 SCREEN FOR COLON CANCER: ICD-10-CM

## 2025-08-11 DIAGNOSIS — R79.89 LOW TESTOSTERONE: Primary | ICD-10-CM

## 2025-08-11 DIAGNOSIS — E78.00 HYPERCHOLESTEROLEMIA: ICD-10-CM

## 2025-08-11 DIAGNOSIS — G47.33 OBSTRUCTIVE SLEEP APNEA: ICD-10-CM

## 2025-08-11 DIAGNOSIS — E66.9 OBESITY (BMI 30-39.9): ICD-10-CM

## 2025-08-11 DIAGNOSIS — E11.9 TYPE 2 DIABETES MELLITUS WITHOUT COMPLICATION, WITHOUT LONG-TERM CURRENT USE OF INSULIN (HCC): ICD-10-CM

## 2025-08-11 DIAGNOSIS — D35.2 PITUITARY MICROADENOMA (HCC): ICD-10-CM

## 2025-08-11 PROCEDURE — 99214 OFFICE O/P EST MOD 30 MIN: CPT | Performed by: FAMILY MEDICINE

## 2025-08-11 SDOH — ECONOMIC STABILITY: FOOD INSECURITY: WITHIN THE PAST 12 MONTHS, YOU WORRIED THAT YOUR FOOD WOULD RUN OUT BEFORE YOU GOT MONEY TO BUY MORE.: NEVER TRUE

## 2025-08-11 SDOH — ECONOMIC STABILITY: FOOD INSECURITY: WITHIN THE PAST 12 MONTHS, THE FOOD YOU BOUGHT JUST DIDN'T LAST AND YOU DIDN'T HAVE MONEY TO GET MORE.: NEVER TRUE

## 2025-08-11 ASSESSMENT — PATIENT HEALTH QUESTIONNAIRE - PHQ9
9. THOUGHTS THAT YOU WOULD BE BETTER OFF DEAD, OR OF HURTING YOURSELF: NOT AT ALL
8. MOVING OR SPEAKING SO SLOWLY THAT OTHER PEOPLE COULD HAVE NOTICED. OR THE OPPOSITE, BEING SO FIGETY OR RESTLESS THAT YOU HAVE BEEN MOVING AROUND A LOT MORE THAN USUAL: NOT AT ALL
6. FEELING BAD ABOUT YOURSELF - OR THAT YOU ARE A FAILURE OR HAVE LET YOURSELF OR YOUR FAMILY DOWN: NOT AT ALL
1. LITTLE INTEREST OR PLEASURE IN DOING THINGS: NOT AT ALL
5. POOR APPETITE OR OVEREATING: NOT AT ALL
3. TROUBLE FALLING OR STAYING ASLEEP: NOT AT ALL
SUM OF ALL RESPONSES TO PHQ QUESTIONS 1-9: 0
SUM OF ALL RESPONSES TO PHQ QUESTIONS 1-9: 0
4. FEELING TIRED OR HAVING LITTLE ENERGY: NOT AT ALL
2. FEELING DOWN, DEPRESSED OR HOPELESS: NOT AT ALL
SUM OF ALL RESPONSES TO PHQ QUESTIONS 1-9: 0
10. IF YOU CHECKED OFF ANY PROBLEMS, HOW DIFFICULT HAVE THESE PROBLEMS MADE IT FOR YOU TO DO YOUR WORK, TAKE CARE OF THINGS AT HOME, OR GET ALONG WITH OTHER PEOPLE: NOT DIFFICULT AT ALL
7. TROUBLE CONCENTRATING ON THINGS, SUCH AS READING THE NEWSPAPER OR WATCHING TELEVISION: NOT AT ALL
SUM OF ALL RESPONSES TO PHQ QUESTIONS 1-9: 0

## 2025-08-24 RX ORDER — CITALOPRAM HYDROBROMIDE 40 MG/1
40 TABLET ORAL DAILY
Qty: 30 TABLET | Refills: 5 | Status: SHIPPED | OUTPATIENT
Start: 2025-08-24